# Patient Record
Sex: FEMALE | Race: WHITE | HISPANIC OR LATINO | Employment: FULL TIME | URBAN - METROPOLITAN AREA
[De-identification: names, ages, dates, MRNs, and addresses within clinical notes are randomized per-mention and may not be internally consistent; named-entity substitution may affect disease eponyms.]

---

## 2017-04-22 ENCOUNTER — HOSPITAL ENCOUNTER (INPATIENT)
Facility: HOSPITAL | Age: 37
LOS: 4 days | Discharge: HOME | DRG: 144 | End: 2017-04-26
Attending: EMERGENCY MEDICINE | Admitting: INTERNAL MEDICINE
Payer: COMMERCIAL

## 2017-04-22 ENCOUNTER — APPOINTMENT (EMERGENCY)
Dept: RADIOLOGY | Facility: HOSPITAL | Age: 37
DRG: 144 | End: 2017-04-22
Payer: COMMERCIAL

## 2017-04-22 ENCOUNTER — APPOINTMENT (INPATIENT)
Dept: CT IMAGING | Facility: HOSPITAL | Age: 37
DRG: 144 | End: 2017-04-22
Payer: COMMERCIAL

## 2017-04-22 DIAGNOSIS — J18.9 PNEUMONIA: ICD-10-CM

## 2017-04-22 DIAGNOSIS — R06.01 ORTHOPNEA: ICD-10-CM

## 2017-04-22 DIAGNOSIS — I21.4 NSTEMI (NON-ST ELEVATED MYOCARDIAL INFARCTION) (HCC): ICD-10-CM

## 2017-04-22 DIAGNOSIS — R06.00 DYSPNEA: Primary | ICD-10-CM

## 2017-04-22 PROBLEM — J45.909 ASTHMA: Chronic | Status: ACTIVE | Noted: 2017-04-22

## 2017-04-22 PROBLEM — I10 HTN (HYPERTENSION): Chronic | Status: ACTIVE | Noted: 2017-04-22

## 2017-04-22 PROBLEM — J45.909 ASTHMA: Status: ACTIVE | Noted: 2017-04-22

## 2017-04-22 PROBLEM — I50.9 ACUTE CHF (HCC): Status: ACTIVE | Noted: 2017-04-22

## 2017-04-22 PROBLEM — E87.6 HYPOKALEMIA: Status: ACTIVE | Noted: 2017-04-22

## 2017-04-22 PROBLEM — R73.9 HYPERGLYCEMIA: Status: ACTIVE | Noted: 2017-04-22

## 2017-04-22 LAB
ANION GAP SERPL CALCULATED.3IONS-SCNC: 10 MMOL/L (ref 4–13)
ANION GAP SERPL CALCULATED.3IONS-SCNC: 12 MMOL/L (ref 4–13)
APTT PPP: 28 SECONDS (ref 24–36)
APTT PPP: 42 SECONDS (ref 24–36)
APTT PPP: 62 SECONDS (ref 24–36)
BASOPHILS # BLD AUTO: 0.04 THOUSANDS/ΜL (ref 0–0.1)
BASOPHILS NFR BLD AUTO: 1 % (ref 0–1)
BUN SERPL-MCNC: 16 MG/DL (ref 5–25)
BUN SERPL-MCNC: 17 MG/DL (ref 5–25)
CALCIUM SERPL-MCNC: 8.5 MG/DL (ref 8.3–10.1)
CALCIUM SERPL-MCNC: 9.2 MG/DL (ref 8.3–10.1)
CHLORIDE SERPL-SCNC: 100 MMOL/L (ref 100–108)
CHLORIDE SERPL-SCNC: 105 MMOL/L (ref 100–108)
CHOLEST SERPL-MCNC: 171 MG/DL (ref 50–200)
CLARITY, POC: CLEAR
CO2 SERPL-SCNC: 25 MMOL/L (ref 21–32)
CO2 SERPL-SCNC: 26 MMOL/L (ref 21–32)
COLOR, POC: YELLOW
CREAT SERPL-MCNC: 1 MG/DL (ref 0.6–1.3)
CREAT SERPL-MCNC: 1.02 MG/DL (ref 0.6–1.3)
EOSINOPHIL # BLD AUTO: 0.2 THOUSAND/ΜL (ref 0–0.61)
EOSINOPHIL NFR BLD AUTO: 3 % (ref 0–6)
ERYTHROCYTE [DISTWIDTH] IN BLOOD BY AUTOMATED COUNT: 15.3 % (ref 11.6–15.1)
ERYTHROCYTE [DISTWIDTH] IN BLOOD BY AUTOMATED COUNT: 15.3 % (ref 11.6–15.1)
EST. AVERAGE GLUCOSE BLD GHB EST-MCNC: 114 MG/DL
EXT BILIRUBIN, UA: NEGATIVE
EXT BLOOD URINE: NEGATIVE
EXT GLUCOSE, UA: NEGATIVE
EXT KETONES: NEGATIVE
EXT NITRITE, UA: NEGATIVE
EXT PH, UA: 8.5
EXT PROTEIN, UA: NEGATIVE
EXT SPECIFIC GRAVITY, UA: 1.01
EXT UROBILINOGEN: NEGATIVE
GFR SERPL CREATININE-BSD FRML MDRD: >60 ML/MIN/1.73SQ M
GFR SERPL CREATININE-BSD FRML MDRD: >60 ML/MIN/1.73SQ M
GLUCOSE SERPL-MCNC: 160 MG/DL (ref 65–140)
GLUCOSE SERPL-MCNC: 177 MG/DL (ref 65–140)
HBA1C MFR BLD: 5.6 % (ref 4.2–6.3)
HCT VFR BLD AUTO: 38.5 % (ref 34.8–46.1)
HCT VFR BLD AUTO: 41.8 % (ref 34.8–46.1)
HDLC SERPL-MCNC: 81 MG/DL (ref 40–60)
HGB BLD-MCNC: 12.4 G/DL (ref 11.5–15.4)
HGB BLD-MCNC: 13.5 G/DL (ref 11.5–15.4)
INR PPP: 0.93 (ref 0.86–1.16)
LDLC SERPL CALC-MCNC: 83 MG/DL (ref 0–100)
LYMPHOCYTES # BLD AUTO: 2.22 THOUSANDS/ΜL (ref 0.6–4.47)
LYMPHOCYTES NFR BLD AUTO: 32 % (ref 14–44)
MAGNESIUM SERPL-MCNC: 1.5 MG/DL (ref 1.6–2.6)
MCH RBC QN AUTO: 25.8 PG (ref 26.8–34.3)
MCH RBC QN AUTO: 26.2 PG (ref 26.8–34.3)
MCHC RBC AUTO-ENTMCNC: 32.2 G/DL (ref 31.4–37.4)
MCHC RBC AUTO-ENTMCNC: 32.3 G/DL (ref 31.4–37.4)
MCV RBC AUTO: 80 FL (ref 82–98)
MCV RBC AUTO: 81 FL (ref 82–98)
MONOCYTES # BLD AUTO: 0.34 THOUSAND/ΜL (ref 0.17–1.22)
MONOCYTES NFR BLD AUTO: 5 % (ref 4–12)
NEUTROPHILS # BLD AUTO: 4.1 THOUSANDS/ΜL (ref 1.85–7.62)
NEUTS SEG NFR BLD AUTO: 59 % (ref 43–75)
NRBC BLD AUTO-RTO: 0 /100 WBCS
NT-PROBNP SERPL-MCNC: 437 PG/ML
PLATELET # BLD AUTO: 241 THOUSANDS/UL (ref 149–390)
PLATELET # BLD AUTO: 277 THOUSANDS/UL (ref 149–390)
PMV BLD AUTO: 11.5 FL (ref 8.9–12.7)
PMV BLD AUTO: 11.6 FL (ref 8.9–12.7)
POTASSIUM SERPL-SCNC: 3.3 MMOL/L (ref 3.5–5.3)
POTASSIUM SERPL-SCNC: 3.4 MMOL/L (ref 3.5–5.3)
PROTHROMBIN TIME: 12.4 SECONDS (ref 12–14.3)
RBC # BLD AUTO: 4.74 MILLION/UL (ref 3.81–5.12)
RBC # BLD AUTO: 5.23 MILLION/UL (ref 3.81–5.12)
SODIUM SERPL-SCNC: 138 MMOL/L (ref 136–145)
SODIUM SERPL-SCNC: 140 MMOL/L (ref 136–145)
TRIGL SERPL-MCNC: 33 MG/DL
TROPONIN I SERPL-MCNC: 0.13 NG/ML
TROPONIN I SERPL-MCNC: 0.15 NG/ML
TROPONIN I SERPL-MCNC: 0.22 NG/ML
WBC # BLD AUTO: 6.92 THOUSAND/UL (ref 4.31–10.16)
WBC # BLD AUTO: 7.47 THOUSAND/UL (ref 4.31–10.16)
WBC # BLD EST: NEGATIVE 10*3/UL

## 2017-04-22 PROCEDURE — 71275 CT ANGIOGRAPHY CHEST: CPT

## 2017-04-22 PROCEDURE — 84484 ASSAY OF TROPONIN QUANT: CPT | Performed by: EMERGENCY MEDICINE

## 2017-04-22 PROCEDURE — 85730 THROMBOPLASTIN TIME PARTIAL: CPT | Performed by: PHYSICIAN ASSISTANT

## 2017-04-22 PROCEDURE — 85610 PROTHROMBIN TIME: CPT | Performed by: PHYSICIAN ASSISTANT

## 2017-04-22 PROCEDURE — 81002 URINALYSIS NONAUTO W/O SCOPE: CPT | Performed by: EMERGENCY MEDICINE

## 2017-04-22 PROCEDURE — 93005 ELECTROCARDIOGRAM TRACING: CPT | Performed by: EMERGENCY MEDICINE

## 2017-04-22 PROCEDURE — 80048 BASIC METABOLIC PNL TOTAL CA: CPT | Performed by: EMERGENCY MEDICINE

## 2017-04-22 PROCEDURE — 85025 COMPLETE CBC W/AUTO DIFF WBC: CPT | Performed by: EMERGENCY MEDICINE

## 2017-04-22 PROCEDURE — 83036 HEMOGLOBIN GLYCOSYLATED A1C: CPT | Performed by: PHYSICIAN ASSISTANT

## 2017-04-22 PROCEDURE — 85027 COMPLETE CBC AUTOMATED: CPT | Performed by: PHYSICIAN ASSISTANT

## 2017-04-22 PROCEDURE — 81025 URINE PREGNANCY TEST: CPT | Performed by: EMERGENCY MEDICINE

## 2017-04-22 PROCEDURE — 71020 HB CHEST X-RAY 2VW FRONTAL&LATL: CPT

## 2017-04-22 PROCEDURE — 99285 EMERGENCY DEPT VISIT HI MDM: CPT

## 2017-04-22 PROCEDURE — 80061 LIPID PANEL: CPT | Performed by: PHYSICIAN ASSISTANT

## 2017-04-22 PROCEDURE — 83735 ASSAY OF MAGNESIUM: CPT | Performed by: PHYSICIAN ASSISTANT

## 2017-04-22 PROCEDURE — 85730 THROMBOPLASTIN TIME PARTIAL: CPT | Performed by: INTERNAL MEDICINE

## 2017-04-22 PROCEDURE — 36415 COLL VENOUS BLD VENIPUNCTURE: CPT | Performed by: EMERGENCY MEDICINE

## 2017-04-22 PROCEDURE — 83880 ASSAY OF NATRIURETIC PEPTIDE: CPT | Performed by: EMERGENCY MEDICINE

## 2017-04-22 PROCEDURE — 94640 AIRWAY INHALATION TREATMENT: CPT

## 2017-04-22 PROCEDURE — 84484 ASSAY OF TROPONIN QUANT: CPT | Performed by: PHYSICIAN ASSISTANT

## 2017-04-22 PROCEDURE — 80048 BASIC METABOLIC PNL TOTAL CA: CPT | Performed by: PHYSICIAN ASSISTANT

## 2017-04-22 RX ORDER — MONTELUKAST SODIUM 10 MG/1
10 TABLET ORAL
Status: DISCONTINUED | OUTPATIENT
Start: 2017-04-22 | End: 2017-04-26 | Stop reason: HOSPADM

## 2017-04-22 RX ORDER — BUDESONIDE AND FORMOTEROL FUMARATE DIHYDRATE 160; 4.5 UG/1; UG/1
2 AEROSOL RESPIRATORY (INHALATION) 2 TIMES DAILY
Status: DISCONTINUED | OUTPATIENT
Start: 2017-04-22 | End: 2017-04-26 | Stop reason: HOSPADM

## 2017-04-22 RX ORDER — CLONIDINE HYDROCHLORIDE 0.1 MG/1
0.2 TABLET ORAL 2 TIMES DAILY
Status: DISCONTINUED | OUTPATIENT
Start: 2017-04-22 | End: 2017-04-23

## 2017-04-22 RX ORDER — ACETAMINOPHEN 325 MG/1
650 TABLET ORAL EVERY 4 HOURS PRN
Status: DISCONTINUED | OUTPATIENT
Start: 2017-04-22 | End: 2017-04-26 | Stop reason: HOSPADM

## 2017-04-22 RX ORDER — ALBUTEROL SULFATE 2.5 MG/3ML
SOLUTION RESPIRATORY (INHALATION)
Status: DISPENSED
Start: 2017-04-22 | End: 2017-04-22

## 2017-04-22 RX ORDER — HEPARIN SODIUM 10000 [USP'U]/100ML
3-20 INJECTION, SOLUTION INTRAVENOUS
Status: DISCONTINUED | OUTPATIENT
Start: 2017-04-22 | End: 2017-04-25

## 2017-04-22 RX ORDER — ALBUTEROL SULFATE 2.5 MG/3ML
5 SOLUTION RESPIRATORY (INHALATION) ONCE
Status: COMPLETED | OUTPATIENT
Start: 2017-04-22 | End: 2017-04-22

## 2017-04-22 RX ORDER — DOCUSATE SODIUM 100 MG/1
100 CAPSULE, LIQUID FILLED ORAL 2 TIMES DAILY
Status: DISCONTINUED | OUTPATIENT
Start: 2017-04-22 | End: 2017-04-26 | Stop reason: HOSPADM

## 2017-04-22 RX ORDER — THIAMINE MONONITRATE (VIT B1) 100 MG
100 TABLET ORAL DAILY
Status: DISCONTINUED | OUTPATIENT
Start: 2017-04-22 | End: 2017-04-26 | Stop reason: HOSPADM

## 2017-04-22 RX ORDER — ONDANSETRON 2 MG/ML
4 INJECTION INTRAMUSCULAR; INTRAVENOUS EVERY 6 HOURS PRN
Status: DISCONTINUED | OUTPATIENT
Start: 2017-04-22 | End: 2017-04-26 | Stop reason: HOSPADM

## 2017-04-22 RX ORDER — HEPARIN SODIUM 1000 [USP'U]/ML
4000 INJECTION, SOLUTION INTRAVENOUS; SUBCUTANEOUS ONCE
Status: DISCONTINUED | OUTPATIENT
Start: 2017-04-22 | End: 2017-04-25

## 2017-04-22 RX ORDER — FUROSEMIDE 10 MG/ML
40 INJECTION INTRAMUSCULAR; INTRAVENOUS ONCE
Status: COMPLETED | OUTPATIENT
Start: 2017-04-22 | End: 2017-04-22

## 2017-04-22 RX ORDER — NITROGLYCERIN 0.4 MG/1
0.4 TABLET SUBLINGUAL ONCE
Status: COMPLETED | OUTPATIENT
Start: 2017-04-22 | End: 2017-04-22

## 2017-04-22 RX ORDER — HYDROCHLOROTHIAZIDE 25 MG/1
25 TABLET ORAL DAILY
Status: DISCONTINUED | OUTPATIENT
Start: 2017-04-22 | End: 2017-04-23

## 2017-04-22 RX ORDER — BUDESONIDE AND FORMOTEROL FUMARATE DIHYDRATE 160; 4.5 UG/1; UG/1
2 AEROSOL RESPIRATORY (INHALATION) 2 TIMES DAILY
COMMUNITY

## 2017-04-22 RX ORDER — MONTELUKAST SODIUM 10 MG/1
10 TABLET ORAL
Status: ON HOLD | COMMUNITY
End: 2018-12-14

## 2017-04-22 RX ORDER — ALBUTEROL SULFATE 2.5 MG/3ML
2.5 SOLUTION RESPIRATORY (INHALATION) EVERY 4 HOURS PRN
Status: DISCONTINUED | OUTPATIENT
Start: 2017-04-22 | End: 2017-04-26 | Stop reason: HOSPADM

## 2017-04-22 RX ORDER — ALBUTEROL SULFATE 90 UG/1
2 AEROSOL, METERED RESPIRATORY (INHALATION) EVERY 6 HOURS PRN
COMMUNITY

## 2017-04-22 RX ORDER — CLONIDINE HYDROCHLORIDE 0.2 MG/1
0.2 TABLET ORAL 2 TIMES DAILY
COMMUNITY
End: 2017-04-26 | Stop reason: HOSPADM

## 2017-04-22 RX ORDER — FUROSEMIDE 40 MG/1
40 TABLET ORAL
Status: DISCONTINUED | OUTPATIENT
Start: 2017-04-22 | End: 2017-04-22

## 2017-04-22 RX ORDER — HEPARIN SODIUM 1000 [USP'U]/ML
2000 INJECTION, SOLUTION INTRAVENOUS; SUBCUTANEOUS AS NEEDED
Status: DISCONTINUED | OUTPATIENT
Start: 2017-04-22 | End: 2017-04-25

## 2017-04-22 RX ORDER — ASPIRIN 81 MG/1
325 TABLET, CHEWABLE ORAL DAILY
Status: DISCONTINUED | OUTPATIENT
Start: 2017-04-23 | End: 2017-04-25

## 2017-04-22 RX ORDER — VERAPAMIL HYDROCHLORIDE 360 MG/1
360 CAPSULE, DELAYED RELEASE PELLETS ORAL DAILY
COMMUNITY
End: 2018-12-14 | Stop reason: HOSPADM

## 2017-04-22 RX ORDER — CALCIUM CARBONATE 200(500)MG
1000 TABLET,CHEWABLE ORAL DAILY PRN
Status: DISCONTINUED | OUTPATIENT
Start: 2017-04-22 | End: 2017-04-26 | Stop reason: HOSPADM

## 2017-04-22 RX ORDER — HEPARIN SODIUM 1000 [USP'U]/ML
4000 INJECTION, SOLUTION INTRAVENOUS; SUBCUTANEOUS AS NEEDED
Status: DISCONTINUED | OUTPATIENT
Start: 2017-04-22 | End: 2017-04-25

## 2017-04-22 RX ORDER — POTASSIUM CHLORIDE 750 MG/1
10 TABLET, EXTENDED RELEASE ORAL 2 TIMES DAILY
Status: DISCONTINUED | OUTPATIENT
Start: 2017-04-22 | End: 2017-04-24

## 2017-04-22 RX ORDER — HYDROCHLOROTHIAZIDE 25 MG/1
25 TABLET ORAL EVERY OTHER DAY
Status: ON HOLD | COMMUNITY
End: 2018-12-14

## 2017-04-22 RX ORDER — LOSARTAN POTASSIUM 50 MG/1
100 TABLET ORAL DAILY
Status: DISCONTINUED | OUTPATIENT
Start: 2017-04-22 | End: 2017-04-26 | Stop reason: HOSPADM

## 2017-04-22 RX ORDER — LANOLIN ALCOHOL/MO/W.PET/CERES
100 CREAM (GRAM) TOPICAL DAILY
COMMUNITY

## 2017-04-22 RX ORDER — POTASSIUM CHLORIDE 750 MG/1
10 TABLET, EXTENDED RELEASE ORAL DAILY
COMMUNITY

## 2017-04-22 RX ORDER — POTASSIUM CHLORIDE 20 MEQ/1
20 TABLET, EXTENDED RELEASE ORAL ONCE
Status: COMPLETED | OUTPATIENT
Start: 2017-04-22 | End: 2017-04-22

## 2017-04-22 RX ORDER — LOSARTAN POTASSIUM 100 MG/1
100 TABLET ORAL DAILY
COMMUNITY
End: 2018-12-14 | Stop reason: HOSPADM

## 2017-04-22 RX ADMIN — HEPARIN SODIUM 4000 UNITS: 1000 INJECTION, SOLUTION INTRAVENOUS; SUBCUTANEOUS at 11:39

## 2017-04-22 RX ADMIN — HEPARIN SODIUM AND DEXTROSE 15.1 UNITS/KG/HR: 10000; 5 INJECTION INTRAVENOUS at 21:37

## 2017-04-22 RX ADMIN — AZITHROMYCIN MONOHYDRATE 500 MG: 500 INJECTION, POWDER, LYOPHILIZED, FOR SOLUTION INTRAVENOUS at 10:00

## 2017-04-22 RX ADMIN — HEPARIN SODIUM 4000 UNITS: 1000 INJECTION, SOLUTION INTRAVENOUS; SUBCUTANEOUS at 05:31

## 2017-04-22 RX ADMIN — POTASSIUM CHLORIDE 10 MEQ: 750 TABLET, EXTENDED RELEASE ORAL at 17:26

## 2017-04-22 RX ADMIN — DOCUSATE SODIUM 100 MG: 100 CAPSULE, LIQUID FILLED ORAL at 17:26

## 2017-04-22 RX ADMIN — FUROSEMIDE 40 MG: 10 INJECTION, SOLUTION INTRAMUSCULAR; INTRAVENOUS at 04:11

## 2017-04-22 RX ADMIN — Medication 100 MG: at 08:20

## 2017-04-22 RX ADMIN — FUROSEMIDE 40 MG: 40 TABLET ORAL at 08:19

## 2017-04-22 RX ADMIN — ALBUTEROL SULFATE 5 MG: 2.5 SOLUTION RESPIRATORY (INHALATION) at 01:59

## 2017-04-22 RX ADMIN — LOSARTAN POTASSIUM 100 MG: 50 TABLET, FILM COATED ORAL at 08:19

## 2017-04-22 RX ADMIN — POTASSIUM CHLORIDE 20 MEQ: 1500 TABLET, EXTENDED RELEASE ORAL at 06:47

## 2017-04-22 RX ADMIN — HEPARIN SODIUM AND DEXTROSE 11.1 UNITS/KG/HR: 10000; 5 INJECTION INTRAVENOUS at 05:31

## 2017-04-22 RX ADMIN — DOCUSATE SODIUM 100 MG: 100 CAPSULE, LIQUID FILLED ORAL at 08:20

## 2017-04-22 RX ADMIN — BUDESONIDE AND FORMOTEROL FUMARATE DIHYDRATE 2 PUFF: 160; 4.5 AEROSOL RESPIRATORY (INHALATION) at 08:20

## 2017-04-22 RX ADMIN — POTASSIUM CHLORIDE 10 MEQ: 750 TABLET, EXTENDED RELEASE ORAL at 08:20

## 2017-04-22 RX ADMIN — CLONIDINE HYDROCHLORIDE 0.2 MG: 0.1 TABLET ORAL at 17:26

## 2017-04-22 RX ADMIN — NITROGLYCERIN 0.4 MG: 0.4 TABLET SUBLINGUAL at 04:11

## 2017-04-22 RX ADMIN — IPRATROPIUM BROMIDE 0.5 MG: 0.5 SOLUTION RESPIRATORY (INHALATION) at 01:59

## 2017-04-22 RX ADMIN — BUDESONIDE AND FORMOTEROL FUMARATE DIHYDRATE 2 PUFF: 160; 4.5 AEROSOL RESPIRATORY (INHALATION) at 17:26

## 2017-04-22 RX ADMIN — MONTELUKAST SODIUM 10 MG: 10 TABLET, FILM COATED ORAL at 21:10

## 2017-04-22 RX ADMIN — IOHEXOL 90 ML: 350 INJECTION, SOLUTION INTRAVENOUS at 04:38

## 2017-04-22 RX ADMIN — CEFTRIAXONE 1000 MG: 1 INJECTION, POWDER, FOR SOLUTION INTRAMUSCULAR; INTRAVENOUS at 09:00

## 2017-04-22 RX ADMIN — VERAPAMIL HYDROCHLORIDE 360 MG: 180 TABLET, FILM COATED, EXTENDED RELEASE ORAL at 21:10

## 2017-04-22 RX ADMIN — HYDROCHLOROTHIAZIDE 25 MG: 25 TABLET ORAL at 08:20

## 2017-04-22 RX ADMIN — CLONIDINE HYDROCHLORIDE 0.2 MG: 0.1 TABLET ORAL at 08:19

## 2017-04-23 LAB
APTT PPP: 55 SECONDS (ref 24–36)
APTT PPP: 63 SECONDS (ref 24–36)
APTT PPP: 64 SECONDS (ref 24–36)

## 2017-04-23 PROCEDURE — 85730 THROMBOPLASTIN TIME PARTIAL: CPT | Performed by: INTERNAL MEDICINE

## 2017-04-23 RX ORDER — CLONIDINE HYDROCHLORIDE 0.1 MG/1
0.1 TABLET ORAL 2 TIMES DAILY
Status: DISCONTINUED | OUTPATIENT
Start: 2017-04-23 | End: 2017-04-26 | Stop reason: HOSPADM

## 2017-04-23 RX ADMIN — CEFTRIAXONE 1000 MG: 1 INJECTION, POWDER, FOR SOLUTION INTRAMUSCULAR; INTRAVENOUS at 09:16

## 2017-04-23 RX ADMIN — DOCUSATE SODIUM 100 MG: 100 CAPSULE, LIQUID FILLED ORAL at 08:42

## 2017-04-23 RX ADMIN — DOCUSATE SODIUM 100 MG: 100 CAPSULE, LIQUID FILLED ORAL at 17:51

## 2017-04-23 RX ADMIN — HYDROCHLOROTHIAZIDE 25 MG: 25 TABLET ORAL at 08:42

## 2017-04-23 RX ADMIN — HEPARIN SODIUM 2000 UNITS: 1000 INJECTION, SOLUTION INTRAVENOUS; SUBCUTANEOUS at 00:29

## 2017-04-23 RX ADMIN — Medication 100 MG: at 08:42

## 2017-04-23 RX ADMIN — LOSARTAN POTASSIUM 100 MG: 50 TABLET, FILM COATED ORAL at 08:42

## 2017-04-23 RX ADMIN — BUDESONIDE AND FORMOTEROL FUMARATE DIHYDRATE 2 PUFF: 160; 4.5 AEROSOL RESPIRATORY (INHALATION) at 08:43

## 2017-04-23 RX ADMIN — BUDESONIDE AND FORMOTEROL FUMARATE DIHYDRATE 2 PUFF: 160; 4.5 AEROSOL RESPIRATORY (INHALATION) at 17:52

## 2017-04-23 RX ADMIN — MONTELUKAST SODIUM 10 MG: 10 TABLET, FILM COATED ORAL at 21:30

## 2017-04-23 RX ADMIN — ASPIRIN 81 MG CHEWABLE TABLET 325 MG: 81 TABLET CHEWABLE at 08:48

## 2017-04-23 RX ADMIN — HEPARIN SODIUM AND DEXTROSE 17.1 UNITS/KG/HR: 10000; 5 INJECTION INTRAVENOUS at 13:47

## 2017-04-23 RX ADMIN — POTASSIUM CHLORIDE 10 MEQ: 750 TABLET, EXTENDED RELEASE ORAL at 17:51

## 2017-04-23 RX ADMIN — POTASSIUM CHLORIDE 10 MEQ: 750 TABLET, EXTENDED RELEASE ORAL at 08:43

## 2017-04-23 RX ADMIN — AZITHROMYCIN MONOHYDRATE 500 MG: 500 INJECTION, POWDER, LYOPHILIZED, FOR SOLUTION INTRAVENOUS at 09:56

## 2017-04-23 RX ADMIN — CLONIDINE HYDROCHLORIDE 0.1 MG: 0.1 TABLET ORAL at 17:51

## 2017-04-23 RX ADMIN — CLONIDINE HYDROCHLORIDE 0.2 MG: 0.1 TABLET ORAL at 08:42

## 2017-04-24 ENCOUNTER — APPOINTMENT (INPATIENT)
Dept: NON INVASIVE DIAGNOSTICS | Facility: HOSPITAL | Age: 37
DRG: 144 | End: 2017-04-24
Payer: COMMERCIAL

## 2017-04-24 ENCOUNTER — APPOINTMENT (INPATIENT)
Dept: NUCLEAR MEDICINE | Facility: HOSPITAL | Age: 37
DRG: 144 | End: 2017-04-24
Payer: COMMERCIAL

## 2017-04-24 LAB
ANION GAP SERPL CALCULATED.3IONS-SCNC: 9 MMOL/L (ref 4–13)
APTT PPP: 45 SECONDS (ref 24–36)
APTT PPP: 55 SECONDS (ref 24–36)
APTT PPP: 71 SECONDS (ref 24–36)
ATRIAL RATE: 97 BPM
BUN SERPL-MCNC: 22 MG/DL (ref 5–25)
CALCIUM SERPL-MCNC: 8.7 MG/DL (ref 8.3–10.1)
CHLORIDE SERPL-SCNC: 102 MMOL/L (ref 100–108)
CO2 SERPL-SCNC: 27 MMOL/L (ref 21–32)
CREAT SERPL-MCNC: 1.1 MG/DL (ref 0.6–1.3)
CRP SERPL QL: 7.7 MG/L
ERYTHROCYTE [DISTWIDTH] IN BLOOD BY AUTOMATED COUNT: 15.8 % (ref 11.6–15.1)
ERYTHROCYTE [SEDIMENTATION RATE] IN BLOOD: 46 MM/HOUR (ref 0–20)
GFR SERPL CREATININE-BSD FRML MDRD: 55.9 ML/MIN/1.73SQ M
GLUCOSE SERPL-MCNC: 109 MG/DL (ref 65–140)
HCT VFR BLD AUTO: 37.9 % (ref 34.8–46.1)
HGB BLD-MCNC: 12.1 G/DL (ref 11.5–15.4)
MAGNESIUM SERPL-MCNC: 1.9 MG/DL (ref 1.6–2.6)
MAX DIASTOLIC BP: 82 MMHG
MAX HEART RATE: 108 BPM
MAX PREDICTED HEART RATE: 183 BPM
MAX. SYSTOLIC BP: 128 MMHG
MCH RBC QN AUTO: 26 PG (ref 26.8–34.3)
MCHC RBC AUTO-ENTMCNC: 31.9 G/DL (ref 31.4–37.4)
MCV RBC AUTO: 82 FL (ref 82–98)
P AXIS: 65 DEGREES
PLATELET # BLD AUTO: 249 THOUSANDS/UL (ref 149–390)
PMV BLD AUTO: 11 FL (ref 8.9–12.7)
POTASSIUM SERPL-SCNC: 4 MMOL/L (ref 3.5–5.3)
PR INTERVAL: 126 MS
PROTOCOL NAME: NORMAL
QRS AXIS: 5 DEGREES
QRSD INTERVAL: 76 MS
QT INTERVAL: 370 MS
QTC INTERVAL: 469 MS
RBC # BLD AUTO: 4.65 MILLION/UL (ref 3.81–5.12)
REASON FOR TERMINATION: NORMAL
SODIUM SERPL-SCNC: 138 MMOL/L (ref 136–145)
T WAVE AXIS: 119 DEGREES
TARGET HR FORMULA: NORMAL
TEST INDICATION: NORMAL
TIME IN EXERCISE PHASE: 180 S
VENTRICULAR RATE: 97 BPM
WBC # BLD AUTO: 7.21 THOUSAND/UL (ref 4.31–10.16)

## 2017-04-24 PROCEDURE — 80048 BASIC METABOLIC PNL TOTAL CA: CPT | Performed by: FAMILY MEDICINE

## 2017-04-24 PROCEDURE — 83735 ASSAY OF MAGNESIUM: CPT | Performed by: FAMILY MEDICINE

## 2017-04-24 PROCEDURE — 86140 C-REACTIVE PROTEIN: CPT | Performed by: PHYSICIAN ASSISTANT

## 2017-04-24 PROCEDURE — 86039 ANTINUCLEAR ANTIBODIES (ANA): CPT | Performed by: PHYSICIAN ASSISTANT

## 2017-04-24 PROCEDURE — 86038 ANTINUCLEAR ANTIBODIES: CPT | Performed by: PHYSICIAN ASSISTANT

## 2017-04-24 PROCEDURE — 85730 THROMBOPLASTIN TIME PARTIAL: CPT | Performed by: INTERNAL MEDICINE

## 2017-04-24 PROCEDURE — A9502 TC99M TETROFOSMIN: HCPCS

## 2017-04-24 PROCEDURE — 85652 RBC SED RATE AUTOMATED: CPT | Performed by: PHYSICIAN ASSISTANT

## 2017-04-24 PROCEDURE — 93017 CV STRESS TEST TRACING ONLY: CPT

## 2017-04-24 PROCEDURE — 86430 RHEUMATOID FACTOR TEST QUAL: CPT | Performed by: PHYSICIAN ASSISTANT

## 2017-04-24 PROCEDURE — 78452 HT MUSCLE IMAGE SPECT MULT: CPT

## 2017-04-24 PROCEDURE — 85027 COMPLETE CBC AUTOMATED: CPT | Performed by: FAMILY MEDICINE

## 2017-04-24 PROCEDURE — 86255 FLUORESCENT ANTIBODY SCREEN: CPT | Performed by: PHYSICIAN ASSISTANT

## 2017-04-24 RX ORDER — HEPARIN SODIUM 1000 [USP'U]/ML
30 INJECTION, SOLUTION INTRAVENOUS; SUBCUTANEOUS ONCE
Status: DISCONTINUED | OUTPATIENT
Start: 2017-04-24 | End: 2017-04-24

## 2017-04-24 RX ORDER — ATORVASTATIN CALCIUM 40 MG/1
40 TABLET, FILM COATED ORAL
Status: DISCONTINUED | OUTPATIENT
Start: 2017-04-24 | End: 2017-04-25

## 2017-04-24 RX ADMIN — AZITHROMYCIN MONOHYDRATE 500 MG: 500 INJECTION, POWDER, LYOPHILIZED, FOR SOLUTION INTRAVENOUS at 15:01

## 2017-04-24 RX ADMIN — CEFTRIAXONE 1000 MG: 1 INJECTION, POWDER, FOR SOLUTION INTRAMUSCULAR; INTRAVENOUS at 14:14

## 2017-04-24 RX ADMIN — BUDESONIDE AND FORMOTEROL FUMARATE DIHYDRATE 2 PUFF: 160; 4.5 AEROSOL RESPIRATORY (INHALATION) at 08:26

## 2017-04-24 RX ADMIN — CLONIDINE HYDROCHLORIDE 0.1 MG: 0.1 TABLET ORAL at 08:26

## 2017-04-24 RX ADMIN — POTASSIUM CHLORIDE 10 MEQ: 750 TABLET, EXTENDED RELEASE ORAL at 08:26

## 2017-04-24 RX ADMIN — HEPARIN SODIUM 2000 UNITS: 1000 INJECTION, SOLUTION INTRAVENOUS; SUBCUTANEOUS at 08:26

## 2017-04-24 RX ADMIN — Medication 100 MG: at 08:26

## 2017-04-24 RX ADMIN — CLONIDINE HYDROCHLORIDE 0.1 MG: 0.1 TABLET ORAL at 20:57

## 2017-04-24 RX ADMIN — VERAPAMIL HYDROCHLORIDE 240 MG: 120 TABLET, FILM COATED, EXTENDED RELEASE ORAL at 21:01

## 2017-04-24 RX ADMIN — DOCUSATE SODIUM 100 MG: 100 CAPSULE, LIQUID FILLED ORAL at 08:25

## 2017-04-24 RX ADMIN — REGADENOSON 0.4 MG: 0.08 INJECTION, SOLUTION INTRAVENOUS at 11:07

## 2017-04-24 RX ADMIN — HEPARIN SODIUM 2000 UNITS: 1000 INJECTION, SOLUTION INTRAVENOUS; SUBCUTANEOUS at 21:20

## 2017-04-24 RX ADMIN — ATORVASTATIN CALCIUM 40 MG: 40 TABLET, FILM COATED ORAL at 17:02

## 2017-04-24 RX ADMIN — ASPIRIN 81 MG CHEWABLE TABLET 324 MG: 81 TABLET CHEWABLE at 09:23

## 2017-04-24 RX ADMIN — BUDESONIDE AND FORMOTEROL FUMARATE DIHYDRATE 2 PUFF: 160; 4.5 AEROSOL RESPIRATORY (INHALATION) at 17:02

## 2017-04-24 RX ADMIN — HEPARIN SODIUM AND DEXTROSE 17.1 UNITS/KG/HR: 10000; 5 INJECTION INTRAVENOUS at 06:28

## 2017-04-24 RX ADMIN — LOSARTAN POTASSIUM 100 MG: 50 TABLET, FILM COATED ORAL at 08:25

## 2017-04-24 RX ADMIN — HEPARIN SODIUM AND DEXTROSE 19.1 UNITS/KG/HR: 10000; 5 INJECTION INTRAVENOUS at 20:53

## 2017-04-24 RX ADMIN — MONTELUKAST SODIUM 10 MG: 10 TABLET, FILM COATED ORAL at 21:02

## 2017-04-24 RX ADMIN — DOCUSATE SODIUM 100 MG: 100 CAPSULE, LIQUID FILLED ORAL at 17:02

## 2017-04-25 ENCOUNTER — APPOINTMENT (INPATIENT)
Dept: INTERVENTIONAL RADIOLOGY/VASCULAR | Facility: HOSPITAL | Age: 37
DRG: 144 | End: 2017-04-25
Payer: COMMERCIAL

## 2017-04-25 ENCOUNTER — APPOINTMENT (INPATIENT)
Dept: NON INVASIVE DIAGNOSTICS | Facility: HOSPITAL | Age: 37
DRG: 144 | End: 2017-04-25
Payer: COMMERCIAL

## 2017-04-25 LAB
APTT PPP: 100 SECONDS (ref 24–36)
RHEUMATOID FACT SER QL LA: NEGATIVE

## 2017-04-25 PROCEDURE — 99152 MOD SED SAME PHYS/QHP 5/>YRS: CPT | Performed by: INTERNAL MEDICINE

## 2017-04-25 PROCEDURE — C1894 INTRO/SHEATH, NON-LASER: HCPCS | Performed by: INTERNAL MEDICINE

## 2017-04-25 PROCEDURE — 4A023N7 MEASUREMENT OF CARDIAC SAMPLING AND PRESSURE, LEFT HEART, PERCUTANEOUS APPROACH: ICD-10-PCS | Performed by: INTERNAL MEDICINE

## 2017-04-25 PROCEDURE — 85730 THROMBOPLASTIN TIME PARTIAL: CPT | Performed by: INTERNAL MEDICINE

## 2017-04-25 PROCEDURE — 93458 L HRT ARTERY/VENTRICLE ANGIO: CPT | Performed by: INTERNAL MEDICINE

## 2017-04-25 PROCEDURE — 99153 MOD SED SAME PHYS/QHP EA: CPT | Performed by: INTERNAL MEDICINE

## 2017-04-25 PROCEDURE — C1769 GUIDE WIRE: HCPCS | Performed by: INTERNAL MEDICINE

## 2017-04-25 PROCEDURE — B2111ZZ FLUOROSCOPY OF MULTIPLE CORONARY ARTERIES USING LOW OSMOLAR CONTRAST: ICD-10-PCS | Performed by: INTERNAL MEDICINE

## 2017-04-25 PROCEDURE — 93306 TTE W/DOPPLER COMPLETE: CPT

## 2017-04-25 RX ORDER — NITROGLYCERIN 20 MG/100ML
INJECTION INTRAVENOUS CODE/TRAUMA/SEDATION MEDICATION
Status: COMPLETED | OUTPATIENT
Start: 2017-04-25 | End: 2017-04-25

## 2017-04-25 RX ORDER — HEPARIN SODIUM 1000 [USP'U]/ML
INJECTION, SOLUTION INTRAVENOUS; SUBCUTANEOUS CODE/TRAUMA/SEDATION MEDICATION
Status: COMPLETED | OUTPATIENT
Start: 2017-04-25 | End: 2017-04-25

## 2017-04-25 RX ORDER — ASPIRIN 81 MG/1
81 TABLET, CHEWABLE ORAL DAILY
Status: DISCONTINUED | OUTPATIENT
Start: 2017-04-26 | End: 2017-04-25

## 2017-04-25 RX ORDER — SODIUM CHLORIDE 9 MG/ML
75 INJECTION, SOLUTION INTRAVENOUS CONTINUOUS
Status: DISCONTINUED | OUTPATIENT
Start: 2017-04-25 | End: 2017-04-26

## 2017-04-25 RX ORDER — MIDAZOLAM HYDROCHLORIDE 1 MG/ML
INJECTION INTRAMUSCULAR; INTRAVENOUS CODE/TRAUMA/SEDATION MEDICATION
Status: COMPLETED | OUTPATIENT
Start: 2017-04-25 | End: 2017-04-25

## 2017-04-25 RX ORDER — VERAPAMIL HCL 2.5 MG/ML
AMPUL (ML) INTRAVENOUS CODE/TRAUMA/SEDATION MEDICATION
Status: COMPLETED | OUTPATIENT
Start: 2017-04-25 | End: 2017-04-25

## 2017-04-25 RX ADMIN — DOCUSATE SODIUM 100 MG: 100 CAPSULE, LIQUID FILLED ORAL at 11:21

## 2017-04-25 RX ADMIN — MONTELUKAST SODIUM 10 MG: 10 TABLET, FILM COATED ORAL at 21:25

## 2017-04-25 RX ADMIN — CLONIDINE HYDROCHLORIDE 0.1 MG: 0.1 TABLET ORAL at 19:57

## 2017-04-25 RX ADMIN — SODIUM CHLORIDE 75 ML/HR: 0.9 INJECTION, SOLUTION INTRAVENOUS at 11:21

## 2017-04-25 RX ADMIN — Medication 100 MG: at 11:20

## 2017-04-25 RX ADMIN — ASPIRIN 81 MG CHEWABLE TABLET 324 MG: 81 TABLET CHEWABLE at 08:23

## 2017-04-25 RX ADMIN — DOCUSATE SODIUM 100 MG: 100 CAPSULE, LIQUID FILLED ORAL at 17:23

## 2017-04-25 RX ADMIN — VERAPAMIL HYDROCHLORIDE 240 MG: 120 TABLET, FILM COATED, EXTENDED RELEASE ORAL at 21:26

## 2017-04-25 RX ADMIN — MIDAZOLAM HYDROCHLORIDE 1 MG: 1 INJECTION, SOLUTION INTRAMUSCULAR; INTRAVENOUS at 08:53

## 2017-04-25 RX ADMIN — BUDESONIDE AND FORMOTEROL FUMARATE DIHYDRATE 2 PUFF: 160; 4.5 AEROSOL RESPIRATORY (INHALATION) at 17:23

## 2017-04-25 RX ADMIN — IOHEXOL 60 ML: 350 INJECTION, SOLUTION INTRAVENOUS at 09:11

## 2017-04-25 RX ADMIN — AZITHROMYCIN MONOHYDRATE 500 MG: 500 INJECTION, POWDER, LYOPHILIZED, FOR SOLUTION INTRAVENOUS at 13:37

## 2017-04-25 RX ADMIN — NITROGLYCERIN 200 MCG: 20 INJECTION INTRAVENOUS at 09:01

## 2017-04-25 RX ADMIN — HEPARIN SODIUM 2000 UNITS: 1000 INJECTION INTRAVENOUS; SUBCUTANEOUS at 09:11

## 2017-04-25 RX ADMIN — VERAPAMIL HYDROCHLORIDE 2.5 MG: 2.5 INJECTION, SOLUTION INTRAVENOUS at 09:02

## 2017-04-25 RX ADMIN — CEFTRIAXONE 1000 MG: 1 INJECTION, POWDER, FOR SOLUTION INTRAMUSCULAR; INTRAVENOUS at 12:11

## 2017-04-25 RX ADMIN — BUDESONIDE AND FORMOTEROL FUMARATE DIHYDRATE 2 PUFF: 160; 4.5 AEROSOL RESPIRATORY (INHALATION) at 08:23

## 2017-04-26 VITALS
TEMPERATURE: 98.4 F | BODY MASS INDEX: 42.27 KG/M2 | HEIGHT: 63 IN | SYSTOLIC BLOOD PRESSURE: 120 MMHG | WEIGHT: 238.54 LBS | DIASTOLIC BLOOD PRESSURE: 86 MMHG | OXYGEN SATURATION: 98 % | HEART RATE: 79 BPM | RESPIRATION RATE: 18 BRPM

## 2017-04-26 PROBLEM — I50.9 ACUTE CHF (HCC): Status: RESOLVED | Noted: 2017-04-22 | Resolved: 2017-04-26

## 2017-04-26 PROBLEM — J18.9 PNEUMONIA: Status: ACTIVE | Noted: 2017-04-26

## 2017-04-26 PROBLEM — I21.4 NSTEMI (NON-ST ELEVATED MYOCARDIAL INFARCTION) (HCC): Status: RESOLVED | Noted: 2017-04-22 | Resolved: 2017-04-26

## 2017-04-26 PROBLEM — E87.6 HYPOKALEMIA: Status: RESOLVED | Noted: 2017-04-22 | Resolved: 2017-04-26

## 2017-04-26 PROBLEM — R73.9 HYPERGLYCEMIA: Status: RESOLVED | Noted: 2017-04-22 | Resolved: 2017-04-26

## 2017-04-26 LAB
ANA HOMOGEN SER QL IF: NORMAL
ANA HOMOGEN TITR SER: NORMAL {TITER}
ANION GAP SERPL CALCULATED.3IONS-SCNC: 9 MMOL/L (ref 4–13)
BUN SERPL-MCNC: 13 MG/DL (ref 5–25)
CALCIUM SERPL-MCNC: 8.5 MG/DL (ref 8.3–10.1)
CHLORIDE SERPL-SCNC: 104 MMOL/L (ref 100–108)
CO2 SERPL-SCNC: 25 MMOL/L (ref 21–32)
CREAT SERPL-MCNC: 0.94 MG/DL (ref 0.6–1.3)
GFR SERPL CREATININE-BSD FRML MDRD: >60 ML/MIN/1.73SQ M
GLUCOSE SERPL-MCNC: 95 MG/DL (ref 65–140)
MAGNESIUM SERPL-MCNC: 1.9 MG/DL (ref 1.6–2.6)
POTASSIUM SERPL-SCNC: 4.3 MMOL/L (ref 3.5–5.3)
RYE IGE QN: POSITIVE
SODIUM SERPL-SCNC: 138 MMOL/L (ref 136–145)

## 2017-04-26 PROCEDURE — 83735 ASSAY OF MAGNESIUM: CPT | Performed by: INTERNAL MEDICINE

## 2017-04-26 PROCEDURE — 80048 BASIC METABOLIC PNL TOTAL CA: CPT | Performed by: INTERNAL MEDICINE

## 2017-04-26 RX ORDER — CLONIDINE HYDROCHLORIDE 0.1 MG/1
0.1 TABLET ORAL 2 TIMES DAILY
Qty: 60 TABLET | Refills: 0 | Status: ON HOLD | OUTPATIENT
Start: 2017-04-26 | End: 2018-12-14

## 2017-04-26 RX ORDER — CEFDINIR 300 MG/1
300 CAPSULE ORAL EVERY 12 HOURS SCHEDULED
Status: DISCONTINUED | OUTPATIENT
Start: 2017-04-26 | End: 2017-04-26 | Stop reason: HOSPADM

## 2017-04-26 RX ORDER — CEFDINIR 300 MG/1
300 CAPSULE ORAL EVERY 12 HOURS SCHEDULED
Qty: 6 CAPSULE | Refills: 0 | Status: SHIPPED | OUTPATIENT
Start: 2017-04-26 | End: 2017-04-29

## 2017-04-26 RX ORDER — AZITHROMYCIN 250 MG/1
500 TABLET, FILM COATED ORAL ONCE
Status: COMPLETED | OUTPATIENT
Start: 2017-04-26 | End: 2017-04-26

## 2017-04-26 RX ADMIN — AZITHROMYCIN 500 MG: 250 TABLET, FILM COATED ORAL at 12:20

## 2017-04-26 RX ADMIN — LOSARTAN POTASSIUM 100 MG: 50 TABLET, FILM COATED ORAL at 09:38

## 2017-04-26 RX ADMIN — CLONIDINE HYDROCHLORIDE 0.1 MG: 0.1 TABLET ORAL at 09:38

## 2017-04-26 RX ADMIN — ENOXAPARIN SODIUM 40 MG: 40 INJECTION SUBCUTANEOUS at 09:38

## 2017-04-26 RX ADMIN — CEFDINIR 300 MG: 300 CAPSULE ORAL at 12:21

## 2017-04-26 RX ADMIN — BUDESONIDE AND FORMOTEROL FUMARATE DIHYDRATE 2 PUFF: 160; 4.5 AEROSOL RESPIRATORY (INHALATION) at 09:39

## 2017-04-26 RX ADMIN — DOCUSATE SODIUM 100 MG: 100 CAPSULE, LIQUID FILLED ORAL at 09:38

## 2017-04-26 RX ADMIN — Medication 100 MG: at 09:38

## 2017-04-26 RX ADMIN — SODIUM CHLORIDE 75 ML/HR: 0.9 INJECTION, SOLUTION INTRAVENOUS at 00:00

## 2017-04-27 LAB
C-ANCA TITR SER IF: NORMAL TITER
MYELOPEROXIDASE AB SER IA-ACNC: <9 U/ML (ref 0–9)
P-ANCA ATYPICAL TITR SER IF: NORMAL TITER
P-ANCA TITR SER IF: NORMAL TITER
PROTEINASE3 AB SER IA-ACNC: <3.5 U/ML (ref 0–3.5)

## 2017-05-26 ENCOUNTER — ALLSCRIPTS OFFICE VISIT (OUTPATIENT)
Dept: OTHER | Facility: OTHER | Age: 37
End: 2017-05-26

## 2017-06-30 ENCOUNTER — ALLSCRIPTS OFFICE VISIT (OUTPATIENT)
Dept: OTHER | Facility: OTHER | Age: 37
End: 2017-06-30

## 2017-10-25 ENCOUNTER — HOSPITAL ENCOUNTER (EMERGENCY)
Facility: HOSPITAL | Age: 37
Discharge: HOME | End: 2017-10-26
Attending: EMERGENCY MEDICINE | Admitting: EMERGENCY MEDICINE
Payer: COMMERCIAL

## 2017-10-25 VITALS
DIASTOLIC BLOOD PRESSURE: 81 MMHG | TEMPERATURE: 98.7 F | BODY MASS INDEX: 42.52 KG/M2 | RESPIRATION RATE: 18 BRPM | HEART RATE: 108 BPM | HEIGHT: 63 IN | OXYGEN SATURATION: 98 % | WEIGHT: 240 LBS | SYSTOLIC BLOOD PRESSURE: 119 MMHG

## 2017-10-25 DIAGNOSIS — R50.9 FEVER: Primary | ICD-10-CM

## 2017-10-25 DIAGNOSIS — E87.6 HYPOKALEMIA: ICD-10-CM

## 2017-10-25 LAB
BASOPHILS # BLD AUTO: 0.01 THOUSANDS/ΜL (ref 0–0.1)
BASOPHILS NFR BLD AUTO: 0 % (ref 0–1)
EOSINOPHIL # BLD AUTO: 0.11 THOUSAND/ΜL (ref 0–0.61)
EOSINOPHIL NFR BLD AUTO: 2 % (ref 0–6)
ERYTHROCYTE [DISTWIDTH] IN BLOOD BY AUTOMATED COUNT: 15 % (ref 11.6–15.1)
HCT VFR BLD AUTO: 38.3 % (ref 34.8–46.1)
HGB BLD-MCNC: 12.4 G/DL (ref 11.5–15.4)
LYMPHOCYTES # BLD AUTO: 1.67 THOUSANDS/ΜL (ref 0.6–4.47)
LYMPHOCYTES NFR BLD AUTO: 34 % (ref 14–44)
MCH RBC QN AUTO: 27 PG (ref 26.8–34.3)
MCHC RBC AUTO-ENTMCNC: 32.4 G/DL (ref 31.4–37.4)
MCV RBC AUTO: 83 FL (ref 82–98)
MONOCYTES # BLD AUTO: 0.45 THOUSAND/ΜL (ref 0.17–1.22)
MONOCYTES NFR BLD AUTO: 9 % (ref 4–12)
NEUTROPHILS # BLD AUTO: 2.64 THOUSANDS/ΜL (ref 1.85–7.62)
NEUTS SEG NFR BLD AUTO: 55 % (ref 43–75)
PLATELET # BLD AUTO: 281 THOUSANDS/UL (ref 149–390)
PMV BLD AUTO: 11.5 FL (ref 8.9–12.7)
RBC # BLD AUTO: 4.6 MILLION/UL (ref 3.81–5.12)
WBC # BLD AUTO: 4.88 THOUSAND/UL (ref 4.31–10.16)

## 2017-10-25 PROCEDURE — 36415 COLL VENOUS BLD VENIPUNCTURE: CPT | Performed by: EMERGENCY MEDICINE

## 2017-10-25 PROCEDURE — 87207 SMEAR SPECIAL STAIN: CPT | Performed by: EMERGENCY MEDICINE

## 2017-10-25 PROCEDURE — 86618 LYME DISEASE ANTIBODY: CPT | Performed by: EMERGENCY MEDICINE

## 2017-10-25 PROCEDURE — 86140 C-REACTIVE PROTEIN: CPT | Performed by: EMERGENCY MEDICINE

## 2017-10-25 PROCEDURE — 80053 COMPREHEN METABOLIC PANEL: CPT | Performed by: EMERGENCY MEDICINE

## 2017-10-25 PROCEDURE — 81003 URINALYSIS AUTO W/O SCOPE: CPT | Performed by: EMERGENCY MEDICINE

## 2017-10-25 PROCEDURE — 85025 COMPLETE CBC W/AUTO DIFF WBC: CPT | Performed by: EMERGENCY MEDICINE

## 2017-10-25 PROCEDURE — 82550 ASSAY OF CK (CPK): CPT | Performed by: EMERGENCY MEDICINE

## 2017-10-25 PROCEDURE — 83605 ASSAY OF LACTIC ACID: CPT | Performed by: EMERGENCY MEDICINE

## 2017-10-25 RX ORDER — IRBESARTAN 150 MG/1
150 TABLET ORAL
COMMUNITY
End: 2018-12-14 | Stop reason: HOSPADM

## 2017-10-26 LAB
% PARASITEMIA: 0
ALBUMIN SERPL BCP-MCNC: 3.6 G/DL (ref 3.5–5)
ALP SERPL-CCNC: 150 U/L (ref 46–116)
ALT SERPL W P-5'-P-CCNC: 72 U/L (ref 12–78)
ANION GAP SERPL CALCULATED.3IONS-SCNC: 10 MMOL/L (ref 4–13)
AST SERPL W P-5'-P-CCNC: 39 U/L (ref 5–45)
BILIRUB SERPL-MCNC: 0.4 MG/DL (ref 0.2–1)
BILIRUB UR QL STRIP: NEGATIVE
BUN SERPL-MCNC: 17 MG/DL (ref 5–25)
CALCIUM SERPL-MCNC: 8.7 MG/DL (ref 8.3–10.1)
CHLORIDE SERPL-SCNC: 99 MMOL/L (ref 100–108)
CK SERPL-CCNC: 70 U/L (ref 26–192)
CLARITY UR: CLEAR
CO2 SERPL-SCNC: 28 MMOL/L (ref 21–32)
COLOR UR: YELLOW
CREAT SERPL-MCNC: 1.08 MG/DL (ref 0.6–1.3)
CRP SERPL QL: 53.8 MG/L
GFR SERPL CREATININE-BSD FRML MDRD: 66 ML/MIN/1.73SQ M
GLUCOSE SERPL-MCNC: 89 MG/DL (ref 65–140)
GLUCOSE UR STRIP-MCNC: NEGATIVE MG/DL
HGB UR QL STRIP.AUTO: NEGATIVE
KETONES UR STRIP-MCNC: ABNORMAL MG/DL
LACTATE SERPL-SCNC: 1.2 MMOL/L (ref 0.5–2)
LEUKOCYTE ESTERASE UR QL STRIP: NEGATIVE
NITRITE UR QL STRIP: NEGATIVE
PARASITE BLD: NO
PATHOLOGIST INTERPRETATION: NORMAL
PH UR STRIP.AUTO: 6 [PH] (ref 4.5–8)
POTASSIUM SERPL-SCNC: 3.3 MMOL/L (ref 3.5–5.3)
PROT SERPL-MCNC: 8.6 G/DL (ref 6.4–8.2)
PROT UR STRIP-MCNC: NEGATIVE MG/DL
SODIUM SERPL-SCNC: 137 MMOL/L (ref 136–145)
SP GR UR STRIP.AUTO: 1.02 (ref 1–1.03)
UROBILINOGEN UR QL STRIP.AUTO: 0.2 E.U./DL

## 2017-10-26 PROCEDURE — 99283 EMERGENCY DEPT VISIT LOW MDM: CPT

## 2017-10-26 RX ORDER — POTASSIUM CHLORIDE 20 MEQ/1
20 TABLET, EXTENDED RELEASE ORAL ONCE
Status: COMPLETED | OUTPATIENT
Start: 2017-10-26 | End: 2017-10-26

## 2017-10-26 RX ADMIN — POTASSIUM CHLORIDE 20 MEQ: 1500 TABLET, EXTENDED RELEASE ORAL at 00:42

## 2017-10-26 NOTE — DISCHARGE INSTRUCTIONS
Fever in Adults   WHAT YOU NEED TO KNOW:   A fever is an increase in your body temperature  Normal body temperature is 98 6°F (37°C)  Fever is generally defined as greater than 100 4°F (38°C)  Common causes include an infection, injury, or disease such as arthritis  DISCHARGE INSTRUCTIONS:   Return to the emergency department if:   · Your fever does not go away or gets worse even after treatment  · You have a stiff neck and a bad headache  · You are confused  You may not be able to think clearly or remember things like you normally do  · Your heart beats faster than usual even after treatment  · You have shortness of breath or chest pain when you breathe  · You urinate small amounts or not at all  · Your skin, lips, or nails turn blue  Contact your healthcare provider if:   · You have abdominal pain or you feel bloated  · You have nausea or are vomiting  · You have pain or burning when you urinate, or you have pain in your back  · You have questions or concerns about your condition or care  Medicines: You may need any of the following:  · NSAIDs , such as ibuprofen, help decrease swelling, pain, and fever  This medicine is available with or without a doctor's order  NSAIDs can cause stomach bleeding or kidney problems in certain people  If you take blood thinner medicine, always ask if NSAIDs are safe for you  Always read the medicine label and follow directions  Do not give these medicines to children under 10months of age without direction from your child's healthcare provider  · Acetaminophen  decreases pain and fever  It is available without a doctor's order  Ask how much to take and how often to take it  Follow directions  Read the labels of all other medicines you are using to see if they also contain acetaminophen, or ask your doctor or pharmacist  Acetaminophen can cause liver damage if not taken correctly   Do not use more than 4 grams (4,000 milligrams) total of acetaminophen in one day  · Antibiotics  may be given if you have an infection caused by bacteria  · Take your medicine as directed  Contact your healthcare provider if you think your medicine is not helping or if you have side effects  Tell him of her if you are allergic to any medicine  Keep a list of the medicines, vitamins, and herbs you take  Include the amounts, and when and why you take them  Bring the list or the pill bottles to follow-up visits  Carry your medicine list with you in case of an emergency  Follow up with your healthcare provider as directed:  Write down your questions so you remember to ask them during your visits  Self-care:   · Drink more liquids as directed  A fever makes you sweat  This can increase your risk for dehydration  Liquids can help prevent dehydration  ¨ Drink at least 6 to 8 eight-ounce cups of clear liquids each day  Drink water, juice, or broth  Do not drink sports drinks  They may contain caffeine  ¨ Ask your healthcare provider if you should drink an oral rehydration solution (ORS)  An ORS has the right amounts of water, salts, and sugar you need to replace body fluids  · Dress in lightweight clothes  Shivers may be a sign that your fever is rising  Do not put extra blankets or clothes on  This may cause your fever to rise even higher  Dress in light, comfortable clothing  Use a lightweight blanket or sheet when you sleep  Change your clothes, blanket, or sheets if they get wet  · Cool yourself safely  Take a bath in cool or lukewarm water  Use an ice pack wrapped in a small towel or wet a washcloth with cool water  Place the ice pack or wet washcloth on your forehead or the back of your neck  © 2017 [address] [employer] Information is for End User's use only and may not be sold, redistributed or otherwise used for commercial purposes   All illustrations and images included in CareNotes® are the copyrighted property of [employer]  or [employer]  The above information is an  only  It is not intended as medical advice for individual conditions or treatments  Talk to your doctor, nurse or pharmacist before following any medical regimen to see if it is safe and effective for you

## 2017-10-26 NOTE — ED PROVIDER NOTES
History  Chief Complaint   Patient presents with    Fever - 9 weeks to 74 years     pt has had a fever since Saturday complains of chills and aches     75-year-old female with a history of an unclear hematological issue presents with intermittent fevers over the past 5 days  Patient states that she has had body aches associated with the fevers but denies any other symptoms  She states these get improved with the administration of naproxen  She denies any symptoms at present but was concerned due to her prior history where she was hospitalized after an influenza immunization with diffuse swelling of her lymph nodes that they evaluated her for lymphoma  The patient states that she did not have lymphoma was told by the infectious disease doctor that she had a low immune system and she should not get additional immunizations  The patient did not have any recent vaccinations or changes to her medications  The patient did have travel to [place] in July  She denies any cyclically to the fevers  Impression and plan:  Fever of unknown origin  Patient has no other symptoms at present  Patient is afebrile in the emergency room and she last had antipyretics at noon  Considering patient's history of possible immunological issues, will obtain laboratory evaluation including CBC to evaluate for leukocytosis, CRP to evaluate for inflammatory response, metabolic evaluation and send a parasite smear considering recent international travel  I explained the patient this testing will not be complete prior to likely discharge tonight  Patient has follow-up with her primary care physician tomorrow                  Fever - 9 weeks to 74 years   Severity:  Moderate  Onset quality:  Gradual  Timing:  Intermittent  Progression:  Waxing and waning  Chronicity:  New  Worsened by:  Nothing  Ineffective treatments:  None tried  Associated symptoms: no chest pain, no chills, no confusion, no congestion, no cough, no diarrhea, no dysuria, no ear pain, no headaches, no myalgias, no nausea, no rash, no rhinorrhea, no somnolence, no sore throat and no vomiting        Prior to Admission Medications   Prescriptions Last Dose Informant Patient Reported? Taking? albuterol (PROVENTIL HFA,VENTOLIN HFA) 90 mcg/act inhaler   Yes Yes   Sig: Inhale 2 puffs every 6 (six) hours as needed for wheezing   budesonide-formoterol (SYMBICORT) 160-4 5 mcg/act inhaler   Yes Yes   Sig: Inhale 2 puffs 2 (two) times a day   cloNIDine (CATAPRES) 0 1 mg tablet   No Yes   Sig: Take 1 tablet by mouth 2 (two) times a day for 30 days   hydrochlorothiazide (HYDRODIURIL) 25 mg tablet   Yes Yes   Sig: Take 25 mg by mouth every other day     irbesartan (AVAPRO) 150 mg tablet   Yes Yes   Sig: Take 150 mg by mouth daily at bedtime   losartan (COZAAR) 100 MG tablet   Yes No   Sig: Take 100 mg by mouth daily   montelukast (SINGULAIR) 10 mg tablet   Yes Yes   Sig: Take 10 mg by mouth daily at bedtime   potassium chloride (K-DUR,KLOR-CON) 10 mEq tablet   Yes Yes   Sig: Take 10 mEq by mouth daily     thiamine 100 MG tablet   Yes Yes   Sig: Take 100 mg by mouth daily   verapamil (VERELAN PM) 360 MG 24 hr capsule   Yes Yes   Sig: Take 360 mg by mouth daily        Facility-Administered Medications: None       Past Medical History:   Diagnosis Date    Asthma     Disease of thyroid gland     Hypertension        Past Surgical History:   Procedure Laterality Date    CARPAL TUNNEL RELEASE       SECTION      4 c section    HYSTERECTOMY      LAPAROSCOPIC GASTRIC BANDING      VOCAL CORD LATERALIZATION, ENDOSCOPIC APPROACH W/ MLB         Family History   Problem Relation Age of Onset    Family history unknown: Yes     I have reviewed and agree with the history as documented  Social History   Substance Use Topics    Smoking status: Never Smoker    Smokeless tobacco: Never Used    Alcohol use No        Review of Systems   Constitutional: Positive for fever  Negative for chills  HENT: Negative for congestion, ear pain, rhinorrhea and sore throat  Respiratory: Negative for cough  Cardiovascular: Negative for chest pain  Gastrointestinal: Negative for abdominal pain, diarrhea, nausea and vomiting  Genitourinary: Negative for dysuria and flank pain  Musculoskeletal: Negative for back pain, joint swelling, myalgias, neck pain and neck stiffness  Skin: Negative for rash  Neurological: Negative for headaches  Psychiatric/Behavioral: Negative for confusion  All other systems reviewed and are negative  Physical Exam  ED Triage Vitals [10/25/17 2246]   Temperature Pulse Respirations Blood Pressure SpO2   98 7 °F (37 1 °C) (!) 108 18 119/81 98 %      Temp Source Heart Rate Source Patient Position - Orthostatic VS BP Location FiO2 (%)   Oral Monitor Sitting Right arm --      Pain Score       No Pain           Orthostatic Vital Signs  Vitals:    10/25/17 2246   BP: 119/81   Pulse: (!) 108   Patient Position - Orthostatic VS: Sitting       Physical Exam   Constitutional: She appears well-developed and well-nourished  HENT:   Mouth/Throat: Oropharynx is clear and moist    Eyes: Conjunctivae and EOM are normal  Pupils are equal, round, and reactive to light  Neck: Normal range of motion  Neck supple  No thyromegaly present  No meningeal signs  Cardiovascular: Normal rate and regular rhythm  Exam reveals no friction rub  No murmur heard  Pulmonary/Chest: Effort normal and breath sounds normal  No stridor  No respiratory distress  She has no wheezes  She has no rales  Abdominal: Soft  Bowel sounds are normal  She exhibits no distension and no mass  There is no tenderness  There is no rebound and no guarding  Musculoskeletal: She exhibits no edema or tenderness  Neurological: She is alert  Skin: Skin is warm and dry  Psychiatric: She has a normal mood and affect  Vitals reviewed        ED Medications  Medications   potassium chloride (K-DUR,KLOR-CON) CR tablet 20 mEq (20 mEq Oral Given 10/26/17 0042)       Diagnostic Studies  Results Reviewed     Procedure Component Value Units Date/Time    C-reactive protein [[id number]]  (Abnormal) Collected:  10/25/17 2337    Lab Status:  Final result Specimen:  Blood from Arm, Right Updated:  10/26/17 0027     CRP 53 8 (H) mg/L     CK [[id number]]  (Normal) Collected:  10/25/17 2337    Lab Status:  Final result Specimen:  Blood from Arm, Right Updated:  10/26/17 0027     Total CK 70 U/L     Lactic acid, plasma [[id number]]  (Normal) Collected:  10/25/17 2337    Lab Status:  Final result Specimen:  Blood from Arm, Right Updated:  10/26/17 0016     LACTIC ACID 1 2 mmol/L     Narrative:         Result may be elevated if tourniquet was used during collection      UA w Reflex to Microscopic [[id number]]  (Abnormal) Collected:  10/25/17 2353    Lab Status:  Final result Specimen:  Urine from Urine, Clean Catch Updated:  10/26/17 0005     Color, UA Yellow     Clarity, UA Clear     Specific Gravity, UA 1 025     pH, UA 6 0     Leukocytes, UA Negative     Nitrite, UA Negative     Protein, UA Negative mg/dl      Glucose, UA Negative mg/dl      Ketones, UA Trace (A) mg/dl      Urobilinogen, UA 0 2 E U /dl      Bilirubin, UA Negative     Blood, UA Negative    Comprehensive metabolic panel [[id number]]  (Abnormal) Collected:  10/25/17 2337    Lab Status:  Final result Specimen:  Blood from Arm, Right Updated:  10/26/17 0003     Sodium 137 mmol/L      Potassium 3 3 (L) mmol/L      Chloride 99 (L) mmol/L      CO2 28 mmol/L      Anion Gap 10 mmol/L      BUN 17 mg/dL      Creatinine 1 08 mg/dL      Glucose 89 mg/dL      Calcium 8 7 mg/dL      AST 39 U/L      ALT 72 U/L      Alkaline Phosphatase 150 (H) U/L      Total Protein 8 6 (H) g/dL      Albumin 3 6 g/dL      Total Bilirubin 0 40 mg/dL      eGFR 66 ml/min/1 73sq m     Narrative:         National Kidney Disease Education Program recommendations are as follows:  GFR calculation is accurate only with a steady state creatinine  Chronic Kidney disease less than 60 ml/min/1 73 sq  meters  Kidney failure less than 15 ml/min/1 73 sq  meters  CBC and differential [[id number]]  (Normal) Collected:  10/25/17 2337    Lab Status:  Final result Specimen:  Blood from Arm, Right Updated:  10/25/17 2344     WBC 4 88 Thousand/uL      RBC 4 60 Million/uL      Hemoglobin 12 4 g/dL      Hematocrit 38 3 %      MCV 83 fL      MCH 27 0 pg      MCHC 32 4 g/dL      RDW 15 0 %      MPV 11 5 fL      Platelets 090 Thousands/uL      Neutrophils Relative 55 %      Lymphocytes Relative 34 %      Monocytes Relative 9 %      Eosinophils Relative 2 %      Basophils Relative 0 %      Neutrophils Absolute 2 64 Thousands/µL      Lymphocytes Absolute 1 67 Thousands/µL      Monocytes Absolute 0 45 Thousand/µL      Eosinophils Absolute 0 11 Thousand/µL      Basophils Absolute 0 01 Thousands/µL     Lyme Antibody Profile with reflex to [facility] [[id number]] Collected:  10/25/17 2337    Lab Status: In process Specimen:  Blood from Arm, Right Updated:  10/25/17 2342    Blood Parasite smear [[id number]] Collected:  10/25/17 2337    Lab Status: In process Specimen:  Blood from Arm, Right Updated:  10/25/17 2341                 No orders to display              Procedures  Procedures       Phone Contacts  ED Phone Contact    ED Course  ED Course as of Oct 26 0056   Thu Oct 26, 2017   3501 Discussed the findings with the patient  Discussed the elevated CRP in the possibilities associated with this  Offered additional diagnostic testing including lumbar puncture, which patient declined after discussion of the risks and benefits  Patient has follow-up with her primary care physician in the morning  I discussed return precautions in detail  Provide laboratory information from tonight for her family physician                                  [facility]  ElishatCjimmie Time    Disposition  Final diagnoses:   Fever   Hypokalemia     Time reflects when diagnosis was documented in both MDM as applicable and the Disposition within this note     Time User Action Codes Description Comment    10/26/2017 12:23 AM Stefanie Rosales Add [R50 9] Fever     10/26/2017 12:24 AM Stefanie Hernandez [E87 6] Hypokalemia       ED Disposition     ED Disposition Condition Comment    Discharge  Chantel Chen discharge to home/self care  Condition at discharge: stable      Follow-up Information     Follow up With Specialties Details Why Rachel Hairston MD Internal Medicine Go today Follow up appointment and recheck potassium  [address] [facility] [id number] [phone]          Patient's Medications   Discharge Prescriptions    No medications on file     No discharge procedures on file      ED Provider  Electronically Signed by           Janet Venegas MD  10/26/17 [facility] Honorio Covarrubias MD  10/26/17 6556

## 2017-10-27 LAB
B BURGDOR IGG SER IA-ACNC: 0.44
B BURGDOR IGM SER IA-ACNC: 0.21

## 2018-01-13 VITALS
SYSTOLIC BLOOD PRESSURE: 144 MMHG | BODY MASS INDEX: 40.25 KG/M2 | WEIGHT: 227.19 LBS | DIASTOLIC BLOOD PRESSURE: 92 MMHG | HEIGHT: 63 IN | OXYGEN SATURATION: 98 % | HEART RATE: 64 BPM

## 2018-01-14 VITALS
HEIGHT: 63 IN | SYSTOLIC BLOOD PRESSURE: 136 MMHG | WEIGHT: 238.19 LBS | BODY MASS INDEX: 42.2 KG/M2 | DIASTOLIC BLOOD PRESSURE: 88 MMHG | OXYGEN SATURATION: 97 % | HEART RATE: 94 BPM

## 2018-12-13 ENCOUNTER — APPOINTMENT (EMERGENCY)
Dept: RADIOLOGY | Facility: HOSPITAL | Age: 38
DRG: 202 | End: 2018-12-13

## 2018-12-13 ENCOUNTER — APPOINTMENT (INPATIENT)
Dept: NON INVASIVE DIAGNOSTICS | Facility: HOSPITAL | Age: 38
DRG: 202 | End: 2018-12-13

## 2018-12-13 ENCOUNTER — HOSPITAL ENCOUNTER (INPATIENT)
Facility: HOSPITAL | Age: 38
LOS: 1 days | Discharge: HOME | DRG: 202 | End: 2018-12-14
Attending: EMERGENCY MEDICINE | Admitting: INTERNAL MEDICINE

## 2018-12-13 ENCOUNTER — APPOINTMENT (EMERGENCY)
Dept: CT IMAGING | Facility: HOSPITAL | Age: 38
DRG: 202 | End: 2018-12-13

## 2018-12-13 DIAGNOSIS — J45.909 ASTHMA, UNSPECIFIED ASTHMA SEVERITY, UNSPECIFIED WHETHER COMPLICATED, UNSPECIFIED WHETHER PERSISTENT: Chronic | ICD-10-CM

## 2018-12-13 DIAGNOSIS — R77.8 ELEVATED TROPONIN: ICD-10-CM

## 2018-12-13 DIAGNOSIS — R06.01 ORTHOPNEA: ICD-10-CM

## 2018-12-13 DIAGNOSIS — I10 ESSENTIAL HYPERTENSION: Chronic | ICD-10-CM

## 2018-12-13 DIAGNOSIS — R93.89 ABNORMAL CT OF THE CHEST: ICD-10-CM

## 2018-12-13 DIAGNOSIS — R07.9 CHEST PAIN: ICD-10-CM

## 2018-12-13 DIAGNOSIS — R06.00 PND (PAROXYSMAL NOCTURNAL DYSPNEA): ICD-10-CM

## 2018-12-13 DIAGNOSIS — E03.9 ACQUIRED HYPOTHYROIDISM: ICD-10-CM

## 2018-12-13 DIAGNOSIS — R06.00 DYSPNEA: Primary | ICD-10-CM

## 2018-12-13 LAB
ALBUMIN SERPL BCP-MCNC: 3.4 G/DL (ref 3.5–5)
ALP SERPL-CCNC: 136 U/L (ref 46–116)
ALT SERPL W P-5'-P-CCNC: 118 U/L (ref 12–78)
ANION GAP SERPL CALCULATED.3IONS-SCNC: 5 MMOL/L (ref 4–13)
AST SERPL W P-5'-P-CCNC: 71 U/L (ref 5–45)
ATRIAL RATE: 102 BPM
BASOPHILS # BLD AUTO: 0.04 THOUSANDS/ΜL (ref 0–0.1)
BASOPHILS NFR BLD AUTO: 1 % (ref 0–1)
BILIRUB SERPL-MCNC: 0.3 MG/DL (ref 0.2–1)
BUN SERPL-MCNC: 13 MG/DL (ref 5–25)
CALCIUM SERPL-MCNC: 8.9 MG/DL (ref 8.3–10.1)
CHLORIDE SERPL-SCNC: 104 MMOL/L (ref 100–108)
CO2 SERPL-SCNC: 30 MMOL/L (ref 21–32)
CREAT SERPL-MCNC: 0.97 MG/DL (ref 0.6–1.3)
DEPRECATED D DIMER PPP: 1312 NG/ML (FEU)
EOSINOPHIL # BLD AUTO: 0.16 THOUSAND/ΜL (ref 0–0.61)
EOSINOPHIL NFR BLD AUTO: 3 % (ref 0–6)
ERYTHROCYTE [DISTWIDTH] IN BLOOD BY AUTOMATED COUNT: 14.8 % (ref 11.6–15.1)
GFR SERPL CREATININE-BSD FRML MDRD: 74 ML/MIN/1.73SQ M
GLUCOSE SERPL-MCNC: 137 MG/DL (ref 65–140)
HCG SERPL QL: NEGATIVE
HCT VFR BLD AUTO: 40.6 % (ref 34.8–46.1)
HGB BLD-MCNC: 12.9 G/DL (ref 11.5–15.4)
IMM GRANULOCYTES # BLD AUTO: 0.02 THOUSAND/UL (ref 0–0.2)
IMM GRANULOCYTES NFR BLD AUTO: 0 % (ref 0–2)
LYMPHOCYTES # BLD AUTO: 2.23 THOUSANDS/ΜL (ref 0.6–4.47)
LYMPHOCYTES NFR BLD AUTO: 40 % (ref 14–44)
MCH RBC QN AUTO: 26 PG (ref 26.8–34.3)
MCHC RBC AUTO-ENTMCNC: 31.8 G/DL (ref 31.4–37.4)
MCV RBC AUTO: 82 FL (ref 82–98)
MONOCYTES # BLD AUTO: 0.25 THOUSAND/ΜL (ref 0.17–1.22)
MONOCYTES NFR BLD AUTO: 5 % (ref 4–12)
NEUTROPHILS # BLD AUTO: 2.86 THOUSANDS/ΜL (ref 1.85–7.62)
NEUTS SEG NFR BLD AUTO: 51 % (ref 43–75)
NRBC BLD AUTO-RTO: 0 /100 WBCS
NT-PROBNP SERPL-MCNC: 624 PG/ML
P AXIS: 66 DEGREES
PLATELET # BLD AUTO: 258 THOUSANDS/UL (ref 149–390)
PMV BLD AUTO: 12.1 FL (ref 8.9–12.7)
POTASSIUM SERPL-SCNC: 3.3 MMOL/L (ref 3.5–5.3)
PR INTERVAL: 130 MS
PROCALCITONIN SERPL-MCNC: <0.05 NG/ML
PROT SERPL-MCNC: 8.3 G/DL (ref 6.4–8.2)
QRS AXIS: 23 DEGREES
QRSD INTERVAL: 76 MS
QT INTERVAL: 390 MS
QTC INTERVAL: 508 MS
RBC # BLD AUTO: 4.97 MILLION/UL (ref 3.81–5.12)
SODIUM SERPL-SCNC: 139 MMOL/L (ref 136–145)
T WAVE AXIS: 176 DEGREES
T3FREE SERPL-MCNC: 2.39 PG/ML (ref 2.3–4.2)
T4 FREE SERPL-MCNC: 0.87 NG/DL (ref 0.76–1.46)
TROPONIN I SERPL-MCNC: 0.05 NG/ML
TROPONIN I SERPL-MCNC: <0.02 NG/ML
TSH SERPL DL<=0.05 MIU/L-ACNC: 7.34 UIU/ML (ref 0.36–3.74)
VENTRICULAR RATE: 102 BPM
WBC # BLD AUTO: 5.56 THOUSAND/UL (ref 4.31–10.16)

## 2018-12-13 PROCEDURE — 84439 ASSAY OF FREE THYROXINE: CPT | Performed by: EMERGENCY MEDICINE

## 2018-12-13 PROCEDURE — 84145 PROCALCITONIN (PCT): CPT | Performed by: INTERNAL MEDICINE

## 2018-12-13 PROCEDURE — 93010 ELECTROCARDIOGRAM REPORT: CPT | Performed by: INTERNAL MEDICINE

## 2018-12-13 PROCEDURE — 99291 CRITICAL CARE FIRST HOUR: CPT

## 2018-12-13 PROCEDURE — 93306 TTE W/DOPPLER COMPLETE: CPT | Performed by: INTERNAL MEDICINE

## 2018-12-13 PROCEDURE — 84484 ASSAY OF TROPONIN QUANT: CPT | Performed by: EMERGENCY MEDICINE

## 2018-12-13 PROCEDURE — 80053 COMPREHEN METABOLIC PANEL: CPT | Performed by: EMERGENCY MEDICINE

## 2018-12-13 PROCEDURE — 71275 CT ANGIOGRAPHY CHEST: CPT

## 2018-12-13 PROCEDURE — 85025 COMPLETE CBC W/AUTO DIFF WBC: CPT | Performed by: EMERGENCY MEDICINE

## 2018-12-13 PROCEDURE — 84481 FREE ASSAY (FT-3): CPT | Performed by: PHYSICIAN ASSISTANT

## 2018-12-13 PROCEDURE — 93306 TTE W/DOPPLER COMPLETE: CPT

## 2018-12-13 PROCEDURE — 84703 CHORIONIC GONADOTROPIN ASSAY: CPT | Performed by: EMERGENCY MEDICINE

## 2018-12-13 PROCEDURE — 99254 IP/OBS CNSLTJ NEW/EST MOD 60: CPT | Performed by: INTERNAL MEDICINE

## 2018-12-13 PROCEDURE — 84443 ASSAY THYROID STIM HORMONE: CPT | Performed by: EMERGENCY MEDICINE

## 2018-12-13 PROCEDURE — 83880 ASSAY OF NATRIURETIC PEPTIDE: CPT | Performed by: EMERGENCY MEDICINE

## 2018-12-13 PROCEDURE — 36415 COLL VENOUS BLD VENIPUNCTURE: CPT | Performed by: EMERGENCY MEDICINE

## 2018-12-13 PROCEDURE — 93005 ELECTROCARDIOGRAM TRACING: CPT

## 2018-12-13 PROCEDURE — 94640 AIRWAY INHALATION TREATMENT: CPT

## 2018-12-13 PROCEDURE — 71046 X-RAY EXAM CHEST 2 VIEWS: CPT

## 2018-12-13 PROCEDURE — 85379 FIBRIN DEGRADATION QUANT: CPT | Performed by: EMERGENCY MEDICINE

## 2018-12-13 PROCEDURE — 84484 ASSAY OF TROPONIN QUANT: CPT | Performed by: PHYSICIAN ASSISTANT

## 2018-12-13 PROCEDURE — 96374 THER/PROPH/DIAG INJ IV PUSH: CPT

## 2018-12-13 PROCEDURE — 99223 1ST HOSP IP/OBS HIGH 75: CPT | Performed by: INTERNAL MEDICINE

## 2018-12-13 RX ORDER — ALBUTEROL SULFATE 2.5 MG/3ML
5 SOLUTION RESPIRATORY (INHALATION) ONCE
Status: COMPLETED | OUTPATIENT
Start: 2018-12-13 | End: 2018-12-13

## 2018-12-13 RX ORDER — CLONIDINE HYDROCHLORIDE 0.1 MG/1
0.1 TABLET ORAL 2 TIMES DAILY
Status: DISCONTINUED | OUTPATIENT
Start: 2018-12-13 | End: 2018-12-14 | Stop reason: HOSPADM

## 2018-12-13 RX ORDER — MONTELUKAST SODIUM 10 MG/1
10 TABLET ORAL
Status: DISCONTINUED | OUTPATIENT
Start: 2018-12-13 | End: 2018-12-13

## 2018-12-13 RX ORDER — FUROSEMIDE 10 MG/ML
40 INJECTION INTRAMUSCULAR; INTRAVENOUS ONCE
Status: COMPLETED | OUTPATIENT
Start: 2018-12-13 | End: 2018-12-13

## 2018-12-13 RX ORDER — LEVOTHYROXINE SODIUM 0.07 MG/1
75 TABLET ORAL
Status: DISCONTINUED | OUTPATIENT
Start: 2018-12-13 | End: 2018-12-14 | Stop reason: HOSPADM

## 2018-12-13 RX ORDER — HYDROCHLOROTHIAZIDE 25 MG/1
25 TABLET ORAL EVERY OTHER DAY
Status: DISCONTINUED | OUTPATIENT
Start: 2018-12-13 | End: 2018-12-14 | Stop reason: HOSPADM

## 2018-12-13 RX ORDER — THIAMINE MONONITRATE (VIT B1) 100 MG
100 TABLET ORAL DAILY
Status: DISCONTINUED | OUTPATIENT
Start: 2018-12-13 | End: 2018-12-14 | Stop reason: HOSPADM

## 2018-12-13 RX ORDER — ALBUTEROL SULFATE 2.5 MG/3ML
2.5 SOLUTION RESPIRATORY (INHALATION) EVERY 6 HOURS PRN
Status: DISCONTINUED | OUTPATIENT
Start: 2018-12-13 | End: 2018-12-14 | Stop reason: HOSPADM

## 2018-12-13 RX ORDER — METHYLPREDNISOLONE SODIUM SUCCINATE 40 MG/ML
40 INJECTION, POWDER, LYOPHILIZED, FOR SOLUTION INTRAMUSCULAR; INTRAVENOUS EVERY 8 HOURS SCHEDULED
Status: DISCONTINUED | OUTPATIENT
Start: 2018-12-13 | End: 2018-12-14

## 2018-12-13 RX ORDER — LABETALOL HYDROCHLORIDE 5 MG/ML
10 INJECTION, SOLUTION INTRAVENOUS ONCE
Status: COMPLETED | OUTPATIENT
Start: 2018-12-13 | End: 2018-12-13

## 2018-12-13 RX ORDER — LABETALOL HYDROCHLORIDE 5 MG/ML
10 INJECTION, SOLUTION INTRAVENOUS ONCE
Status: DISCONTINUED | OUTPATIENT
Start: 2018-12-13 | End: 2018-12-13

## 2018-12-13 RX ORDER — MONTELUKAST SODIUM 10 MG/1
10 TABLET ORAL DAILY
Status: DISCONTINUED | OUTPATIENT
Start: 2018-12-13 | End: 2018-12-14 | Stop reason: HOSPADM

## 2018-12-13 RX ORDER — POTASSIUM CHLORIDE 14.9 MG/ML
20 INJECTION INTRAVENOUS ONCE
Status: COMPLETED | OUTPATIENT
Start: 2018-12-13 | End: 2018-12-13

## 2018-12-13 RX ORDER — HEPARIN SODIUM 5000 [USP'U]/ML
5000 INJECTION, SOLUTION INTRAVENOUS; SUBCUTANEOUS EVERY 8 HOURS SCHEDULED
Status: DISCONTINUED | OUTPATIENT
Start: 2018-12-13 | End: 2018-12-14 | Stop reason: HOSPADM

## 2018-12-13 RX ORDER — BUDESONIDE AND FORMOTEROL FUMARATE DIHYDRATE 160; 4.5 UG/1; UG/1
2 AEROSOL RESPIRATORY (INHALATION) 2 TIMES DAILY
Status: DISCONTINUED | OUTPATIENT
Start: 2018-12-13 | End: 2018-12-14 | Stop reason: HOSPADM

## 2018-12-13 RX ADMIN — ALBUTEROL SULFATE 5 MG: 2.5 SOLUTION RESPIRATORY (INHALATION) at 04:12

## 2018-12-13 RX ADMIN — METHYLPREDNISOLONE SODIUM SUCCINATE 40 MG: 40 INJECTION, POWDER, FOR SOLUTION INTRAMUSCULAR; INTRAVENOUS at 21:15

## 2018-12-13 RX ADMIN — HEPARIN SODIUM 5000 UNITS: 5000 INJECTION, SOLUTION INTRAVENOUS; SUBCUTANEOUS at 08:17

## 2018-12-13 RX ADMIN — MONTELUKAST SODIUM 10 MG: 10 TABLET, FILM COATED ORAL at 15:48

## 2018-12-13 RX ADMIN — LEVOTHYROXINE SODIUM 75 MCG: 75 TABLET ORAL at 08:17

## 2018-12-13 RX ADMIN — METHYLPREDNISOLONE SODIUM SUCCINATE 40 MG: 40 INJECTION, POWDER, FOR SOLUTION INTRAMUSCULAR; INTRAVENOUS at 15:49

## 2018-12-13 RX ADMIN — VERAPAMIL HYDROCHLORIDE 120 MG: 120 TABLET, FILM COATED, EXTENDED RELEASE ORAL at 10:12

## 2018-12-13 RX ADMIN — HEPARIN SODIUM 5000 UNITS: 5000 INJECTION, SOLUTION INTRAVENOUS; SUBCUTANEOUS at 15:48

## 2018-12-13 RX ADMIN — LABETALOL 20 MG/4 ML (5 MG/ML) INTRAVENOUS SYRINGE 10 MG: at 05:19

## 2018-12-13 RX ADMIN — IPRATROPIUM BROMIDE 0.5 MG: 0.5 SOLUTION RESPIRATORY (INHALATION) at 04:12

## 2018-12-13 RX ADMIN — HEPARIN SODIUM 5000 UNITS: 5000 INJECTION, SOLUTION INTRAVENOUS; SUBCUTANEOUS at 21:15

## 2018-12-13 RX ADMIN — FUROSEMIDE 40 MG: 10 INJECTION, SOLUTION INTRAMUSCULAR; INTRAVENOUS at 08:16

## 2018-12-13 RX ADMIN — METHYLPREDNISOLONE SODIUM SUCCINATE 40 MG: 40 INJECTION, POWDER, FOR SOLUTION INTRAMUSCULAR; INTRAVENOUS at 08:17

## 2018-12-13 RX ADMIN — AZITHROMYCIN MONOHYDRATE 500 MG: 500 INJECTION, POWDER, LYOPHILIZED, FOR SOLUTION INTRAVENOUS at 08:18

## 2018-12-13 RX ADMIN — IOHEXOL 85 ML: 350 INJECTION, SOLUTION INTRAVENOUS at 06:37

## 2018-12-13 RX ADMIN — BUDESONIDE AND FORMOTEROL FUMARATE DIHYDRATE 2 PUFF: 160; 4.5 AEROSOL RESPIRATORY (INHALATION) at 10:12

## 2018-12-13 RX ADMIN — THIAMINE HCL TAB 100 MG 100 MG: 100 TAB at 08:17

## 2018-12-13 RX ADMIN — CLONIDINE HYDROCHLORIDE 0.1 MG: 0.1 TABLET ORAL at 08:17

## 2018-12-13 RX ADMIN — HYDROCHLOROTHIAZIDE 25 MG: 25 TABLET ORAL at 10:12

## 2018-12-13 RX ADMIN — Medication 1000 MG: at 11:04

## 2018-12-13 RX ADMIN — POTASSIUM CHLORIDE 20 MEQ: 14.9 INJECTION, SOLUTION INTRAVENOUS at 12:15

## 2018-12-13 RX ADMIN — CLONIDINE HYDROCHLORIDE 0.1 MG: 0.1 TABLET ORAL at 18:03

## 2018-12-13 RX ADMIN — BUDESONIDE AND FORMOTEROL FUMARATE DIHYDRATE 2 PUFF: 160; 4.5 AEROSOL RESPIRATORY (INHALATION) at 18:44

## 2018-12-13 NOTE — ASSESSMENT & PLAN NOTE
Patient was on ARB as well as HCTZ for HTN  Ran out 2 months ago  ? Volume overload/CHF/Asthma exacerbation  Will give lasix 40 mg IV and follow up clinically  Will get Echo

## 2018-12-13 NOTE — RESPIRATORY THERAPY NOTE
RT Protocol Note  Franklin Garibay 45 y o  female MRN: [id number]  Unit/Bed#: ED 24 Encounter: [id number]    Assessment    Principal Problem:    Orthopnea  Active Problems:    Asthma    HTN (hypertension)    Hypothyroidism      Home Pulmonary Medications:  inhaler       Past Medical History:   Diagnosis Date    Asthma     Disease of thyroid gland     Hypertension      Social History     Social History    Marital status: /Civil Union     Spouse name: N/A    Number of children: N/A    Years of education: N/A     Social History Main Topics    Smoking status: Never Smoker    Smokeless tobacco: Never Used    Alcohol use No    Drug use: No    Sexual activity: Yes     Partners: Male     Other Topics Concern    None     Social History Narrative    None       Subjective    Subjective Data: patient awake and alert without apparent respiratory distress at this time     Objective    Physical Exam:   Assessment Type: Assess only  General Appearance: Alert, Awake  Respiratory Pattern: Normal, Spontaneous, Dyspnea with exertion  Chest Assessment: Chest expansion symmetrical  Bilateral Breath Sounds: Clear, Diminished (slightly decreased no great adverse breath sounds noted)  Cough: None  O2 Device: room air    Vitals:  Blood pressure 138/88, pulse 94, temperature 98 3 °F (36 8 °C), temperature source Oral, resp  rate 19, weight 112 kg (245 lb 13 oz), SpO2 94 %  Imaging and other studies: I have personally reviewed pertinent reports        O2 Device: room air     Plan    Respiratory Plan: Home Bronchodilator Patient pathway        Resp Comments: respiratory protocol completed at this time patient states she is not having an asthma exacerbation and feels it is more her heart she is refusing aerosol treatments at this time

## 2018-12-13 NOTE — ED PROVIDER NOTES
History  Chief Complaint   Patient presents with    Shortness of Breath     "feels pressure when laying down and SOB   has not been able to take medications for a few months due to no insurance"     78-year-old female presents with a chief complaint of "I hear the gargling of the water when I'm breathing" that the patient further characterizes as orthopnea, noting the symptoms occur only with supine positioning and mainly at night while attempting sleeping  Patient notes attempted treatment with 3 pillows which modestly improved symptoms but she notes persistent recurrence of the symptoms  Patient reportedly notes a history of asthma and has been attempting treatment for asthma but denies any wheezing or other symptoms that would be consistent with asthma exacerbation  Patient does note "I start feeling heavy in the chest" with exertional activities and during her episodes of orthopnea  Patient denies any chest pain at present, noting symptoms occur mainly with supine positioning and occasionally with exertional activities  She notes the symptoms resolve with upright positioning though they have limited her ability to sleep  Patient denies any history of congestive heart failure  Impression and plan:  Orthopnea with a broad differential   Based on patient's history and physical, concerns for potential exacerbation of undiagnosed congestive heart failure  Less likely sleep apnea as patient notes they occur with any supine positioning including while awake  Unusual presentation as patient does not appear fluid overloaded at present  Patient does have a mildly elevated BNP on laboratory evaluation  Patient had prior echocardiogram that was the unremarkable    Discussed potential options with the patient and considering patient's young age and limited ability to follow up due to lack of insurance, will place patient in observation for continued monitoring and evaluation with likely repeat echocardiogram   I do not believe this is likely an asthma exacerbation, patient was treated with nebulized medications with no improvement in her symptoms and does not have other typical symptoms of asthma exacerbation  History provided by:  Patient  Shortness of Breath   Severity:  Moderate  Onset quality:  Gradual  Duration:  2 weeks  Timing:  Constant  Progression:  Worsening  Chronicity:  New  Relieved by:  None tried  Worsened by:  Exertion (laying down)  Ineffective treatments:  None tried  Associated symptoms: chest pain, cough (only with supine positioning, improves) and PND    Associated symptoms: no abdominal pain, no claudication, no diaphoresis, no ear pain, no fever, no headaches, no hemoptysis, no neck pain, no rash, no sore throat, no sputum production, no syncope, no swollen glands, no vomiting and no wheezing        Prior to Admission Medications   Prescriptions Last Dose Informant Patient Reported? Taking?    albuterol (PROVENTIL HFA,VENTOLIN HFA) 90 mcg/act inhaler   Yes Yes   Sig: Inhale 2 puffs every 6 (six) hours as needed for wheezing   budesonide-formoterol (SYMBICORT) 160-4 5 mcg/act inhaler   Yes Yes   Sig: Inhale 2 puffs 2 (two) times a day   cloNIDine (CATAPRES) 0 1 mg tablet   No Yes   Sig: Take 1 tablet by mouth 2 (two) times a day for 30 days   hydrochlorothiazide (HYDRODIURIL) 25 mg tablet   Yes Yes   Sig: Take 25 mg by mouth every other day     irbesartan (AVAPRO) 150 mg tablet   Yes No   Sig: Take 150 mg by mouth daily at bedtime   losartan (COZAAR) 100 MG tablet   Yes Yes   Sig: Take 100 mg by mouth daily   montelukast (SINGULAIR) 10 mg tablet   Yes Yes   Sig: Take 10 mg by mouth daily at bedtime   potassium chloride (K-DUR,KLOR-CON) 10 mEq tablet   Yes Yes   Sig: Take 10 mEq by mouth daily     thiamine 100 MG tablet   Yes Yes   Sig: Take 100 mg by mouth daily   verapamil (VERELAN PM) 360 MG 24 hr capsule   Yes Yes   Sig: Take 360 mg by mouth daily Facility-Administered Medications: None       Past Medical History:   Diagnosis Date    Asthma     Disease of thyroid gland     Hypertension        Past Surgical History:   Procedure Laterality Date    CARPAL TUNNEL RELEASE       SECTION      4 c section    HYSTERECTOMY      LAPAROSCOPIC GASTRIC BANDING      VOCAL CORD LATERALIZATION, ENDOSCOPIC APPROACH W/ MLB         Family History   Problem Relation Age of Onset    Family history unknown: Yes     I have reviewed and agree with the history as documented  Social History   Substance Use Topics    Smoking status: Never Smoker    Smokeless tobacco: Never Used    Alcohol use No        Review of Systems   Constitutional: Negative for diaphoresis and fever  HENT: Negative for ear pain and sore throat  Respiratory: Positive for cough (only with supine positioning, improves) and shortness of breath  Negative for hemoptysis, sputum production and wheezing  Cardiovascular: Positive for chest pain and PND  Negative for claudication and syncope  Gastrointestinal: Negative for abdominal pain and vomiting  Musculoskeletal: Negative for neck pain  Skin: Negative for rash  Neurological: Negative for headaches  All other systems reviewed and are negative  Physical Exam  Physical Exam   Constitutional: She appears well-developed and well-nourished  No distress  HENT:   Head: Normocephalic and atraumatic  Mouth/Throat: Oropharynx is clear and moist    Eyes: Pupils are equal, round, and reactive to light  Neck: Normal range of motion  Neck supple  Cardiovascular: Normal rate and regular rhythm  Pulmonary/Chest: Effort normal and breath sounds normal  No respiratory distress  She has no wheezes  She has no rales  Abdominal: Soft  Bowel sounds are normal  She exhibits no distension  There is no tenderness  There is no guarding  Musculoskeletal: She exhibits no edema or tenderness     No clinical signs of DVT   Neurological: She is alert  Skin: Skin is warm and dry  She is not diaphoretic  Psychiatric: She has a normal mood and affect  Vitals reviewed        Vital Signs  ED Triage Vitals   Temperature Pulse Respirations Blood Pressure SpO2   12/13/18 0402 12/13/18 0356 12/13/18 0356 12/13/18 0356 12/13/18 0356   98 3 °F (36 8 °C) (!) 111 20 (!) 201/118 95 %      Temp Source Heart Rate Source Patient Position - Orthostatic VS BP Location FiO2 (%)   12/13/18 0402 12/13/18 0356 12/13/18 0356 12/13/18 0356 --   Oral Monitor Sitting Right arm       Pain Score       12/13/18 0356       No Pain           Vitals:    12/13/18 2110 12/13/18 2346 12/14/18 0654 12/14/18 1700   BP: 148/84 149/78 132/73 141/94   Pulse: 101 (!) 111 98 100   Patient Position - Orthostatic VS:  Lying Lying Sitting       Visual Acuity  Visual Acuity      Most Recent Value   L Pupil Size (mm)  3   R Pupil Size (mm)  3   L Pupil Shape  Round   R Pupil Shape  Round          ED Medications  Medications   albuterol inhalation solution 5 mg (5 mg Nebulization Given 12/13/18 0412)   ipratropium (ATROVENT) 0 02 % inhalation solution 0 5 mg (0 5 mg Nebulization Given 12/13/18 0412)   labetalol (NORMODYNE) injection 10 mg (10 mg Intravenous Given 12/13/18 0519)   potassium chloride 20 mEq IVPB (premix) (20 mEq Intravenous New Bag 12/13/18 1215)   furosemide (LASIX) injection 40 mg (40 mg Intravenous Given 12/13/18 0816)   iohexol (OMNIPAQUE) 350 MG/ML injection (MULTI-DOSE) 85 mL (85 mL Intravenous Given 12/13/18 0637)       Diagnostic Studies  Results Reviewed     Procedure Component Value Units Date/Time    Comprehensive metabolic panel [[id number]]  (Abnormal) Collected:  12/14/18 0515    Lab Status:  Final result Specimen:  Blood from Arm, Right Updated:  12/14/18 0604     Sodium 137 mmol/L      Potassium 4 0 mmol/L      Chloride 102 mmol/L      CO2 25 mmol/L      ANION GAP 10 mmol/L      BUN 18 mg/dL      Creatinine 1 08 mg/dL      Glucose 161 (H) mg/dL      Calcium 9 7 mg/dL      AST 49 (H) U/L       (H) U/L      Alkaline Phosphatase 119 (H) U/L      Total Protein 8 2 g/dL      Albumin 3 3 (L) g/dL      Total Bilirubin 0 40 mg/dL      eGFR 65 ml/min/1 73sq m     Narrative:         National Kidney Disease Education Program recommendations are as follows:  GFR calculation is accurate only with a steady state creatinine  Chronic Kidney disease less than 60 ml/min/1 73 sq  meters  Kidney failure less than 15 ml/min/1 73 sq  meters      CBC (With Platelets) [[id number]]  (Abnormal) Collected:  12/14/18 0515    Lab Status:  Final result Specimen:  Blood from Arm, Right Updated:  12/14/18 0538     WBC 9 48 Thousand/uL      RBC 4 92 Million/uL      Hemoglobin 12 8 g/dL      Hematocrit 41 7 %      MCV 85 fL      MCH 26 0 (L) pg      MCHC 30 7 (L) g/dL      RDW 15 1 %      Platelets 769 Thousands/uL      MPV 11 9 fL     T3, free [[id number]]  (Normal) Collected:  12/13/18 0425    Lab Status:  Final result Specimen:  Blood from Arm, Left Updated:  12/13/18 1805     T3, Free 2 39 pg/mL     Procalcitonin [[id number]]  (Normal) Collected:  12/13/18 0830    Lab Status:  Final result Specimen:  Blood from Arm, Left Updated:  12/13/18 1256     Procalcitonin <0 05 ng/ml     T4, free [[id number]]  (Normal) Collected:  12/13/18 0425    Lab Status:  Final result Specimen:  Blood from Arm, Left Updated:  12/13/18 0849     Free T4 0 87 ng/dL     hCG, qualitative pregnancy [[id number]]  (Normal) Collected:  12/13/18 0425    Lab Status:  Final result Specimen:  Blood from Arm, Left Updated:  12/13/18 0645     Preg, Serum Negative    D-dimer, quantitative [[id number]]  (Abnormal) Collected:  12/13/18 0518    Lab Status:  Final result Specimen:  Blood from Arm, Left Updated:  12/13/18 0544     D-Dimer, Quant 1,312 (H) ng/ml (FEU)     TSH, 3rd generation with Free T4 reflex [[id number]]  (Abnormal) Collected:  12/13/18 0425    Lab Status:  Final result Specimen:  Blood from Arm, Left Updated:  12/13/18 0522 TSH 3RD GENERATON 7 341 (H) uIU/mL     Narrative:         Patients undergoing fluorescein dye angiography may retain small amounts of fluorescein in the body for 48-72 hours post procedure  Samples containing fluorescein can produce falsely depressed TSH values  If the patient had this procedure,a specimen should be resubmitted post fluorescein clearance  The recommended reference ranges for TSH during pregnancy are as follows:  First trimester 0 1 to 2 5 uIU/mL  Second trimester  0 2 to 3 0 uIU/mL  Third trimester 0 3 to 3 0 uIU/m      BNP [[id number]]  (Abnormal) Collected:  12/13/18 0425    Lab Status:  Final result Specimen:  Blood from Arm, Left Updated:  12/13/18 0502     NT-proBNP 624 (H) pg/mL     Troponin I [[id number]]  (Abnormal) Collected:  12/13/18 0425    Lab Status:  Final result Specimen:  Blood from Arm, Left Updated:  12/13/18 0450     Troponin I 0 05 (H) ng/mL     Comprehensive metabolic panel [[id number]]  (Abnormal) Collected:  12/13/18 0425    Lab Status:  Final result Specimen:  Blood from Arm, Left Updated:  12/13/18 0448     Sodium 139 mmol/L      Potassium 3 3 (L) mmol/L      Chloride 104 mmol/L      CO2 30 mmol/L      ANION GAP 5 mmol/L      BUN 13 mg/dL      Creatinine 0 97 mg/dL      Glucose 137 mg/dL      Calcium 8 9 mg/dL      AST 71 (H) U/L       (H) U/L      Alkaline Phosphatase 136 (H) U/L      Total Protein 8 3 (H) g/dL      Albumin 3 4 (L) g/dL      Total Bilirubin 0 30 mg/dL      eGFR 74 ml/min/1 73sq m     Narrative:         National Kidney Disease Education Program recommendations are as follows:  GFR calculation is accurate only with a steady state creatinine  Chronic Kidney disease less than 60 ml/min/1 73 sq  meters  Kidney failure less than 15 ml/min/1 73 sq  meters      CBC and differential [[id number]]  (Abnormal) Collected:  12/13/18 0425    Lab Status:  Final result Specimen:  Blood from Arm, Left Updated:  12/13/18 0432     WBC 5 56 Thousand/uL      RBC 4 97 Million/uL      Hemoglobin 12 9 g/dL      Hematocrit 40 6 %      MCV 82 fL      MCH 26 0 (L) pg      MCHC 31 8 g/dL      RDW 14 8 %      MPV 12 1 fL      Platelets 705 Thousands/uL      nRBC 0 /100 WBCs      Neutrophils Relative 51 %      Immat GRANS % 0 %      Lymphocytes Relative 40 %      Monocytes Relative 5 %      Eosinophils Relative 3 %      Basophils Relative 1 %      Neutrophils Absolute 2 86 Thousands/µL      Immature Grans Absolute 0 02 Thousand/uL      Lymphocytes Absolute 2 23 Thousands/µL      Monocytes Absolute 0 25 Thousand/µL      Eosinophils Absolute 0 16 Thousand/µL      Basophils Absolute 0 04 Thousands/µL                  CTA ED chest PE study   Final Result by Le Arellano DO (12/13 4149)   1  No CT evidence of pulmonary embolism  2   Diffuse groundglass infiltrates  Differential includes alveolar edema, alveolar pneumonia or hypersensitivity pneumonitis  Clinical correlation recommended  3   4 1 cm ascending aortic aneurysm  The overall size has slightly decreased from the earlier study  The study was marked in [facility] for immediate notification  Workstation performed: [id number]         X-ray chest 2 views   ED Interpretation by Guillermina Mcdonald MD (53/72 5566)   Appears similar to last time but previously read as PNA, unlikely I see no acute findings maybe mild vascular congestion      Final Result by Chitra Andre MD (12/13 3408)   Addendum 1 of 1 by Chitra Andre MD (12/13 3155)   ADDENDUM:      Comparison was made with chest radiograph and CT chest of 4/22/2017  Appearance of right perihilar/lower lung field airspace disease is similar    to chest radiograph of 4/22/2017      Final      Airspace disease throughout the right lower lung field, in appearance suggestive of an acute infiltrate of infectious or inflammatory etiology  However, given clinical presentation, findings may represent asymmetric pulmonary edema    Additional    differential considerations such as pulmonary infarct cannot be excluded  Follow-up with CT PA is recommended for further evaluation  Findings discussed with Dr Jett Ruiz at 5:39 AM, 12/13/18            Workstation performed: [id number]                    Procedures  Procedures       Phone Contacts  ED Phone Contact    ED Course  ED Course as of Dec 16 2128   Thu Dec 13, 2018   0501 EKG demonstrates sinus tachycardia rate of 102  There is ST depression in lead 2  There is minimal ST elevation in V2 that is nondiagnostic in similar to prior EKG in April of 2017      0617 Treated with labetalol due to symptoms associated with elevated hypertension with improvement in symptoms  Initial Sepsis Screening     Row Name 12/13/18 4705                Is the patient's history suggestive of a new or worsening infection? No  -EP        Suspected source of infection          Are two or more of the following signs & symptoms of infection both present and new to the patient? No  -EP        Indicate SIRS criteria          If the answer is yes to both questions, suspicion of sepsis is present          If severe sepsis is present AND tissue hypoperfusion perists in the hour after fluid resuscitation or lactate > 4, the patient meets criteria for SEPTIC SHOCK          Are any of the following organ dysfunction criteria present within 6 hours of suspected infection and SIRS criteria that are NOT considered to be chronic conditions?         Organ dysfunction          Date of presentation of severe sepsis          Time of presentation of severe sepsis          Tissue hypoperfusion persists in the hour after crystalloid fluid administration, evidenced, by either:          Was hypotension present within one hour of the conclusion of crystalloid fluid administration?           Date of presentation of septic shock          Time of presentation of septic shock            User Key  (r) = Recorded By, (t) = Taken By, (c) = Cosigned By    [address] Name Provider Type    EP Tori Chawla MD Physician                  [facility]  CritCare Time    Disposition  Final diagnoses:   Dyspnea   Chest pain   Elevated troponin   PND (paroxysmal nocturnal dyspnea)   Orthopnea   Abnormal CT of the chest     Time reflects when diagnosis was documented in both MDM as applicable and the Disposition within this note     Time User Action Codes Description Comment    12/13/2018  6:21 AM Romayne Aquino Add [R06 00] Dyspnea     12/13/2018  6:21 AM Romayne Aquino Add [R07 9] Chest pain     12/13/2018  6:22 AM Romayne Aquino Add [R74 8] Elevated troponin     12/13/2018  6:22 AM Romayne Aquino Add [R06 00] PND (paroxysmal nocturnal dyspnea)     12/13/2018  6:22 AM Romayne Aquino Add [R06 01] Orthopnea     12/13/2018  7:00 AM Romayne Aquino Add [R93 89] Abnormal CT of the chest     12/13/2018  3:16 PM Shawna Hernandez [J45 909] Asthma, unspecified asthma severity, unspecified whether complicated, unspecified whether persistent     12/13/2018  3:16 PM Renea, [address] [L62 744] Asthma, unspecified asthma severity, unspecified whether complicated, unspecified whether persistent     12/14/2018  9:45 AM Shawna Kendall Add [I10] Essential hypertension     12/14/2018  9:45 AM Shawna Kendall Modify [I10] Essential hypertension     12/14/2018  9:45 AM Renea, [address] Essential hypertension     12/14/2018  3:43 PM Renea, [address] [facility] [E03 9] Acquired hypothyroidism       ED Disposition     ED Disposition Condition Comment    Admit  Case was discussed with AIXA and the patient's admission status was agreed to be Admission Status: inpatient status to the service of Dr Neha Lujan           Follow-up Information     Follow up With Specialties Details Why [address], MD Internal Medicine Schedule an appointment as soon as possible for a visit in 1 week(s)   James Hardwick Steven  [phone]      Al Magdaleno MD Pulmonary Disease Schedule an appointment as soon as possible for a visit in 1 month(s) need sleep study, further investigation to chronic lung changes found on CT chest [address] [facility]      Ritika Mercer MD Rheumatology Schedule an appointment as soon as possible for a visit in 1 month(s) further evaluation of possible underlying inflammatory vs autoimmune disorder given chronic lung changes on CT chest [address] [address]  [phone]            Discharge Medication List as of 12/14/2018  5:28 PM      START taking these medications    Details   levothyroxine 75 mcg tablet Take 1 tablet (75 mcg total) by mouth daily in the early morning, Starting Sat 12/15/2018, Normal      predniSONE 20 mg tablet 40 x 2 days then 30 x 3, 20 x 3 and stop, Normal      verapamil (CALAN-SR) 180 mg CR tablet Take 1 tablet (180 mg total) by mouth daily, Starting Fri 12/14/2018, Normal         CONTINUE these medications which have CHANGED    Details   cloNIDine (CATAPRES) 0 1 mg tablet Take 1 tablet (0 1 mg total) by mouth every 12 (twelve) hours for 90 days, Starting Fri 12/14/2018, Until u 3/14/2019, Normal      hydrochlorothiazide (HYDRODIURIL) 25 mg tablet Take 1 tablet (25 mg total) by mouth daily, Starting Fri 12/14/2018, Normal      montelukast (SINGULAIR) 10 mg tablet Take 1 tablet (10 mg total) by mouth daily at bedtime, Starting Fri 12/14/2018, Normal         CONTINUE these medications which have NOT CHANGED    Details   albuterol (PROVENTIL HFA,VENTOLIN HFA) 90 mcg/act inhaler Inhale 2 puffs every 6 (six) hours as needed for wheezing, Until Discontinued, Historical Med      budesonide-formoterol (SYMBICORT) 160-4 5 mcg/act inhaler Inhale 2 puffs 2 (two) times a day, Until Discontinued, Historical Med      potassium chloride (K-DUR,KLOR-CON) 10 mEq tablet Take 10 mEq by mouth daily  , Until Discontinued, Historical Med      thiamine 100 MG tablet Take 100 mg by mouth daily, Until Discontinued, Historical Med         STOP taking these medications       irbesartan (AVAPRO) 150 mg tablet Comments:   Reason for Stopping:         losartan (COZAAR) 100 MG tablet Comments:   Reason for Stopping:         verapamil (VERELAN PM) 360 MG 24 hr capsule Comments:   Reason for Stopping:               Outpatient Discharge Orders  Ambulatory referral to Pulmonology   Standing Status: Future  Standing Exp   Date: 06/14/19     Discharge Diet     Activity as tolerated     Call provider for:  severe uncontrolled pain     Call provider for:  difficulty breathing, headache or visual disturbances     Call provider for:  persistent dizziness or light-headedness         ED Provider  Electronically Signed by           Bree Tomas MD  12/16/18 8476

## 2018-12-13 NOTE — H&P
H&P- Na Vital 1980, 45 y o  female MRN: [id number]    Unit/Bed#: ED 24 Encounter: [id number]    Primary Care Provider: Alexa Mendoza MD   Date and time admitted to hospital: 12/13/2018  3:51 AM        Hypothyroidism   Assessment & Plan    C/w synthyroid  Again, not been taking this since she ran out of her meds  HTN (hypertension)   Assessment & Plan    Patient was on ARB as well as HCTZ for HTN  Ran out 2 months ago  ? Volume overload/CHF/Asthma exacerbation  Will give lasix 40 mg IV and follow up clinically  Will get Echo  Asthma   Assessment & Plan    On singular, symbicort and ventolin  Ran out of meds 2 months ago  Since then has not been taking  Likely a component of asthma exacerbation in addition to volume overload  * Orthopnea   Assessment & Plan    And dyspnea on exertion  Worse over the last 2 weeks  Volume overload/Asthma/? PNA   Will check procal   Await formal read of CT PE study and give one dose of CAP treatment for now  VTE Prophylaxis: Heparin  / sequential compression device   Code Status: Full Code  POLST: There is no POLST form on file for this patient (pre-hospital)  Discussion with family: Discussed with patient she will update her family in some hours  Anticipated Length of Stay:  Patient will be admitted on an Emergency basis with an anticipated length of stay of  More than 2 midnights  Justification for Hospital Stay: respiratory distress    Total Time for Visit, including Counseling / Coordination of Care: 45 minutes  Greater than 50% of this total time spent on direct patient counseling and coordination of care  Chief Complaint:     Shortness of breath  History of Present Illness:    Na Vital is a 45 y o  female who presents with shortness of breath over the last 2 weeks      The patient notes decreased exercise tolerance where she can only walk a couple feet before getting shortness of breath more than 2 weeks ago she was able to walk for several blocks with no shortness of breath  In addition the patient also notes orthopedic for the last 2 weeks which is gradually been getting worse  The patient denies any leg swelling or any history of cardiac disease or CHF  She was however on hydrochlorothiazide as well as an ARB up until 2 months ago when her insurance lapsed and she has been unable to afford any of her medications  She does specifically deny any weight gain and states that she has in fact lost weight  The patient also has a background of asthma and as she lost her insurance 2 months ago she has also been out of her asthma medications including Singulair Ventolin and Symbicort for the last month or so  Specifically the patient denies any cough fevers chills sick contacts or anything suggesting an infective process  Chest x-ray is more in keeping with pneumonia than pulmonary edema however the story does sound more like volume overload  A CT PE study was done in the emergency department with a report of this is still pending at the time of admission  In addition to the above the patient has a background of hypothyroidism and was on Synthroid again only up until 2 months ago when her insurance ran out and she has not been able to purchase any of her medication       Review of Systems:    Review of Systems   Constitutional: Positive for activity change and unexpected weight change  Negative for appetite change, chills, diaphoresis, fatigue and fever  HENT: Negative  Negative for congestion  Respiratory: Positive for chest tightness, shortness of breath and wheezing  Negative for cough, choking and stridor  Cardiovascular: Negative for chest pain, palpitations and leg swelling  Gastrointestinal: Negative  Endocrine: Negative  Genitourinary: Negative  Musculoskeletal: Negative  Skin: Negative  Allergic/Immunologic: Negative  Neurological: Negative  Hematological: Negative  Past Medical and Surgical History:     Past Medical History:   Diagnosis Date    Asthma     Disease of thyroid gland     Hypertension        Past Surgical History:   Procedure Laterality Date    CARPAL TUNNEL RELEASE       SECTION      4 c section    HYSTERECTOMY      LAPAROSCOPIC GASTRIC BANDING      VOCAL CORD LATERALIZATION, ENDOSCOPIC APPROACH W/ MLB         Meds/Allergies:    Prior to Admission medications    Medication Sig Start Date End Date Taking? Authorizing Provider   albuterol (PROVENTIL HFA,VENTOLIN HFA) 90 mcg/act inhaler Inhale 2 puffs every 6 (six) hours as needed for wheezing    Historical Provider, MD   budesonide-formoterol (SYMBICORT) 160-4 5 mcg/act inhaler Inhale 2 puffs 2 (two) times a day    Historical Provider, MD   cloNIDine (CATAPRES) 0 1 mg tablet Take 1 tablet by mouth 2 (two) times a day for 30 days 4/26/17 10/25/17  Cr Lua,    hydrochlorothiazide (HYDRODIURIL) 25 mg tablet Take 25 mg by mouth every other day      Historical Provider, MD   irbesartan (AVAPRO) 150 mg tablet Take 150 mg by mouth daily at bedtime    Historical Provider, MD   losartan (COZAAR) 100 MG tablet Take 100 mg by mouth daily    Historical Provider, MD   montelukast (SINGULAIR) 10 mg tablet Take 10 mg by mouth daily at bedtime    Historical Provider, MD   potassium chloride (K-DUR,KLOR-CON) 10 mEq tablet Take 10 mEq by mouth daily      Historical Provider, MD   thiamine 100 MG tablet Take 100 mg by mouth daily    Historical Provider, MD   verapamil (VERELAN PM) 360 MG 24 hr capsule Take 360 mg by mouth daily      Historical Provider, MD     I have reviewed home medications with patient personally  Allergies: Allergies   Allergen Reactions    Oxycodone Anaphylaxis    Lisinopril        Social History:     Marital Status: /Civil Union   Occupation:   Patient Pre-hospital Living Situation: lives with      Patient Pre-hospital Level of Mobility: fully  Patient Pre-hospital Diet Restrictions: none  Substance Use History:   History   Alcohol Use No     History   Smoking Status    Never Smoker   Smokeless Tobacco    Never Used     History   Drug Use No       Family History:    Family History   Problem Relation Age of Onset    Family history unknown: Yes       Physical Exam:     Vitals:   Blood Pressure: 155/90 (12/13/18 0521)  Pulse: 104 (12/13/18 0521)  Temperature: 98 3 °F (36 8 °C) (12/13/18 0402)  Temp Source: Oral (12/13/18 0402)  Respirations: 18 (12/13/18 0521)  Weight - Scale: 112 kg (245 lb 13 oz) (12/13/18 0356)  SpO2: 95 % (12/13/18 0521)    Physical Exam   Constitutional: She is oriented to person, place, and time  No distress  Obese  HENT:   Head: Normocephalic and atraumatic  Mouth/Throat: No oropharyngeal exudate  Eyes: Right eye exhibits no discharge  No scleral icterus  Neck: No JVD present  No tracheal deviation present  No thyromegaly present  Cardiovascular: Normal rate  Exam reveals no gallop and no friction rub  No murmur heard  Pulmonary/Chest: Breath sounds normal  She is in respiratory distress  She has no wheezes  She has no rales  She exhibits no tenderness  Abdominal: Soft  She exhibits no distension and no mass  There is no tenderness  There is no rebound and no guarding  Musculoskeletal: Normal range of motion  She exhibits no edema, tenderness or deformity  Lymphadenopathy:     She has no cervical adenopathy  Neurological: She is alert and oriented to person, place, and time  She displays normal reflexes  No cranial nerve deficit  She exhibits abnormal muscle tone  Coordination normal    Skin: Skin is warm and dry  No rash noted  She is not diaphoretic  No erythema  No pallor  Psychiatric: She has a normal mood and affect  Additional Data:     Lab Results: I have personally reviewed pertinent reports          Results from last 7 days  Lab Units 12/13/18  3525   WBC Thousand/uL 5 56   HEMOGLOBIN g/dL 12 9   HEMATOCRIT % 40 6   PLATELETS Thousands/uL 258   NEUTROS PCT % 51   LYMPHS PCT % 40   MONOS PCT % 5   EOS PCT % 3       Results from last 7 days  Lab Units 12/13/18  0425   SODIUM mmol/L 139   POTASSIUM mmol/L 3 3*   CHLORIDE mmol/L 104   CO2 mmol/L 30   BUN mg/dL 13   CREATININE mg/dL 0 97   ANION GAP mmol/L 5   CALCIUM mg/dL 8 9   ALBUMIN g/dL 3 4*   TOTAL BILIRUBIN mg/dL 0 30   ALK PHOS U/L 136*   ALT U/L 118*   AST U/L 71*   GLUCOSE RANDOM mg/dL 137                       Imaging: I have personally reviewed pertinent reports  X-ray chest 2 views   ED Interpretation by Javad Fitch MD (11/86 0791)   Appears similar to last time but previously read as PNA, unlikely I see no acute findings maybe mild vascular congestion      Final Result by Ranjana Castillo MD (12/13 7770)   Addendum 1 of 1 by Ranjana Castillo MD (12/13 0469)   ADDENDUM:      Comparison was made with chest radiograph and CT chest of 4/22/2017  Appearance of right perihilar/lower lung field airspace disease is similar    to chest radiograph of 4/22/2017      Final      Airspace disease throughout the right lower lung field, in appearance suggestive of an acute infiltrate of infectious or inflammatory etiology  However, given clinical presentation, findings may represent asymmetric pulmonary edema  Additional    differential considerations such as pulmonary infarct cannot be excluded  Follow-up with CT PA is recommended for further evaluation  Findings discussed with Dr Arnie Beck at 5:39 AM, 12/13/18            Workstation performed: [id number]         CTA ED chest PE study    (Results Pending)       EKG, Pathology, and Other Studies Reviewed on Admission:   · EKG: no EKG on chart  Will request      Allscripts / Epic Records Reviewed: Yes     ** Please Note: This note has been constructed using a voice recognition system   **

## 2018-12-13 NOTE — ASSESSMENT & PLAN NOTE
On singular, symbicort and ventolin  Ran out of meds 2 months ago  Since then has not been taking  Likely a component of asthma exacerbation in addition to volume overload

## 2018-12-13 NOTE — ASSESSMENT & PLAN NOTE
And dyspnea on exertion  Worse over the last 2 weeks  Volume overload/Asthma/? PNA   Will check procal   Await formal read of CT PE study and give one dose of CAP treatment for now

## 2018-12-13 NOTE — CONSULTS
Consultation - Pulmonary Medicine   Carlos Mckee 45 y o  female MRN: [id number]  Unit/Bed#: -01 Encounter: [id number]      Assessment:  Shortness of breath  Asthma mild intermittent by history with mild acute exacerbation  Abnormal CT of the chest    Plan:   Shortness of breath unclear etiology, D-dimer was elevated when she came in, CT angiogram negative for PE  Discussed CT of the chest with bilateral ground-glass infiltrates, seems to be slightly increased from the prior CT of the chest in April 2017  Paratracheal mediastinal and subcarinal lymph nodes mildly prominent  Asthma mild intermittent by history, with mild acute exacerbation   Continue with levalbuterol and ipratropium  Currently on Solu-Medrol 40 mg q 8 can be decreased to 40 mg q 12h today and decreased to 40 mg daily in a m     Will add Flovent to1 10 mcg 2 puff twice daily  Peak flow monitor and airway clearance measures with flutter valve  White cell count is normal, procalcitonin is normal, do not think the bilateral ground-glass opacities infectious  Can be monitored off antibiotics  Likely inflammatory etiology, as a cause for the bilateral ground-glass opacities,/ ?  Interstitial lung disease  Would need serology off steroids, and also hypersensitivity panel off  steroids  Follow up with echocardiogram,  Outpatient Pulmonary follow-up for PFTs, repeat imaging for resolution of those opacities in 4-6 weeks  Also for serology off steroids    Thank you for the consultation will continue to follow  Discussed with Audrey Samuel from [facility]      History of Present Illness   Physician Requesting Consult: Fredrick Omer MD  Reason for Consult / Principal Problem:  Abnormal CT of the chest  Hx and PE limited by:  None  HPI: Carlos Mckee is a 45y o  year old female who has never smoked, with history of mild intermittent asthma, follows up with pulmonology at [facility], has been albuterol MDI as needed only and she states has not been using it for quite some time, never admitted into the hospital for asthma flare-up in the past   Prior admission into [facility] in April 2017, with shortness of breath found to have abnormal CT of the chest with bilateral ground-glass opacities, likely thought to be inflammatory then, blood work done at that time, demonstrating screening IZZY has been positive, unclear if she followed up rheumatology  Also patient did not have any repeat CT of the chest done since April 2017 for resolution of those opacities  She has history of hypertension hypothyroidism, and has been on medications, and she has stopped taking all the medications for the past 3 months because of insurance issues she states  She states until last week she has been doing okay doing her daily activities and chores at home, no cough, no shortness of breath on exertion, but for the past week had shortness of breath especially when lying down flat  At night, that woke her up from sleep that got worse, that made her come into the ER  Pulmonary has been consulted for the abnormal CT of the chest and shortness of Breath  Inpatient consult to Pulmonology  Consult performed by: Sugey Hay ordered by: Ozzy Soriano          Review of Systems   Constitutional: Positive for fatigue  Negative for appetite change, chills, diaphoresis, fever and unexpected weight change  HENT: Negative for congestion, ear discharge, ear pain, nosebleeds, postnasal drip, rhinorrhea, sinus pain, sore throat and voice change  Eyes: Negative for pain, discharge and visual disturbance  Respiratory: Positive for chest tightness and shortness of breath  Negative for apnea, cough, choking, wheezing and stridor  Cardiovascular: Negative for chest pain, palpitations and leg swelling  Gastrointestinal: Negative for abdominal pain, blood in stool, constipation, diarrhea and vomiting     Endocrine: Negative for cold intolerance, heat intolerance, polydipsia, polyphagia and polyuria  Genitourinary: Negative for difficulty urinating and dysuria  Musculoskeletal: Negative for arthralgias and neck pain  Skin: Negative for pallor and rash  Allergic/Immunologic: Negative for environmental allergies and food allergies  Neurological: Negative for dizziness, speech difficulty, weakness and light-headedness  Hematological: Negative for adenopathy  Does not bruise/bleed easily  Psychiatric/Behavioral: Negative for agitation, confusion and sleep disturbance  The patient is not nervous/anxious  Historical Information   Past Medical History:   Diagnosis Date    Asthma     Disease of thyroid gland     Hypertension      Past Surgical History:   Procedure Laterality Date    CARPAL TUNNEL RELEASE       SECTION      4 c section    HYSTERECTOMY      LAPAROSCOPIC GASTRIC BANDING      VOCAL CORD LATERALIZATION, ENDOSCOPIC APPROACH W/ MLB       Social History   History   Alcohol Use No     History   Drug Use No     History   Smoking Status    Never Smoker   Smokeless Tobacco    Never Used     Occupational History:  Works as a medical assistant at [employer]  Family History: non-contributory    Meds/Allergies   all current active meds have been reviewed    Allergies   Allergen Reactions    Oxycodone Anaphylaxis    Lisinopril        Objective   Vitals: Blood pressure 149/100, pulse (!) 106, temperature 99 °F (37 2 °C), temperature source Oral, resp  rate 20, weight 112 kg (245 lb 13 oz), SpO2 97 %  ,Body mass index is 43 54 kg/m²  Intake/Output Summary (Last 24 hours) at 18 1608  Last data filed at 18 1411   Gross per 24 hour   Intake              360 ml   Output                0 ml   Net              360 ml     Invasive Devices     Peripheral Intravenous Line            Peripheral IV 18 Left Antecubital less than 1 day                Physical Exam   Constitutional: She is oriented to person, place, and time   She appears well-developed and well-nourished  HENT:   Head: Normocephalic and atraumatic  Eyes: Pupils are equal, round, and reactive to light  Conjunctivae are normal    Neck: Normal range of motion  Neck supple  No JVD present  No thyromegaly present  Cardiovascular: Normal rate, regular rhythm and normal heart sounds  Exam reveals no gallop and no friction rub  No murmur heard  Pulmonary/Chest: Effort normal and breath sounds normal  No respiratory distress  She has no wheezes  She has no rales  She exhibits no tenderness  Abdominal: Soft  Bowel sounds are normal    Musculoskeletal: Normal range of motion  She exhibits no edema, tenderness or deformity  Lymphadenopathy:     She has no cervical adenopathy  Neurological: She is alert and oriented to person, place, and time  Skin: Skin is warm and dry  Psychiatric: She has a normal mood and affect  Nursing note and vitals reviewed  Lab Results: I have personally reviewed pertinent lab results  Imaging Studies: I have personally reviewed pertinent films in PACS  EKG, Pathology, and Other Studies: I have personally reviewed pertinent reports      VTE Prophylaxis: Sequential compression device Ania Gill)     Code Status: Level 1 - Full Code  Advance Directive and Living Will:      Power of :    POLST:

## 2018-12-13 NOTE — QUICK NOTE
Post admission follow-up:  Patient admitted early this morning for orthopnea  Has an abnormal appearing CT, had a similar clinical picture in April of 2017  Continue with treatment for possible fluid issues, she is clear pillow orthopnea  Continue to monitor labs closely, strict I/O, daily weights, supplemental O2 as needed  Appreciate pulmonology consultation  She needs outpatient workup  She has an abnormal IZZY in the past, however was reportedly told she did not have any definitive autoimmune/inflammatory disorder (daughter has lupus) and has not followed up since  Will continue to follow

## 2018-12-14 VITALS
BODY MASS INDEX: 43.54 KG/M2 | WEIGHT: 245.81 LBS | TEMPERATURE: 97.7 F | RESPIRATION RATE: 18 BRPM | DIASTOLIC BLOOD PRESSURE: 94 MMHG | HEART RATE: 100 BPM | OXYGEN SATURATION: 97 % | SYSTOLIC BLOOD PRESSURE: 141 MMHG

## 2018-12-14 LAB
ALBUMIN SERPL BCP-MCNC: 3.3 G/DL (ref 3.5–5)
ALP SERPL-CCNC: 119 U/L (ref 46–116)
ALT SERPL W P-5'-P-CCNC: 106 U/L (ref 12–78)
ANION GAP SERPL CALCULATED.3IONS-SCNC: 10 MMOL/L (ref 4–13)
AST SERPL W P-5'-P-CCNC: 49 U/L (ref 5–45)
ATRIAL RATE: 102 BPM
BILIRUB SERPL-MCNC: 0.4 MG/DL (ref 0.2–1)
BUN SERPL-MCNC: 18 MG/DL (ref 5–25)
CALCIUM SERPL-MCNC: 9.7 MG/DL (ref 8.3–10.1)
CHLORIDE SERPL-SCNC: 102 MMOL/L (ref 100–108)
CO2 SERPL-SCNC: 25 MMOL/L (ref 21–32)
CREAT SERPL-MCNC: 1.08 MG/DL (ref 0.6–1.3)
ERYTHROCYTE [DISTWIDTH] IN BLOOD BY AUTOMATED COUNT: 15.1 % (ref 11.6–15.1)
GFR SERPL CREATININE-BSD FRML MDRD: 65 ML/MIN/1.73SQ M
GLUCOSE SERPL-MCNC: 161 MG/DL (ref 65–140)
HCT VFR BLD AUTO: 41.7 % (ref 34.8–46.1)
HGB BLD-MCNC: 12.8 G/DL (ref 11.5–15.4)
MCH RBC QN AUTO: 26 PG (ref 26.8–34.3)
MCHC RBC AUTO-ENTMCNC: 30.7 G/DL (ref 31.4–37.4)
MCV RBC AUTO: 85 FL (ref 82–98)
P AXIS: 72 DEGREES
PLATELET # BLD AUTO: 282 THOUSANDS/UL (ref 149–390)
PMV BLD AUTO: 11.9 FL (ref 8.9–12.7)
POTASSIUM SERPL-SCNC: 4 MMOL/L (ref 3.5–5.3)
PR INTERVAL: 140 MS
PROT SERPL-MCNC: 8.2 G/DL (ref 6.4–8.2)
QRS AXIS: 32 DEGREES
QRSD INTERVAL: 78 MS
QT INTERVAL: 372 MS
QTC INTERVAL: 484 MS
RBC # BLD AUTO: 4.92 MILLION/UL (ref 3.81–5.12)
SODIUM SERPL-SCNC: 137 MMOL/L (ref 136–145)
T WAVE AXIS: 190 DEGREES
VENTRICULAR RATE: 102 BPM
WBC # BLD AUTO: 9.48 THOUSAND/UL (ref 4.31–10.16)

## 2018-12-14 PROCEDURE — 99238 HOSP IP/OBS DSCHRG MGMT 30/<: CPT | Performed by: PHYSICIAN ASSISTANT

## 2018-12-14 PROCEDURE — 99254 IP/OBS CNSLTJ NEW/EST MOD 60: CPT | Performed by: PHYSICIAN ASSISTANT

## 2018-12-14 PROCEDURE — 93005 ELECTROCARDIOGRAM TRACING: CPT

## 2018-12-14 PROCEDURE — 99232 SBSQ HOSP IP/OBS MODERATE 35: CPT | Performed by: INTERNAL MEDICINE

## 2018-12-14 PROCEDURE — 93010 ELECTROCARDIOGRAM REPORT: CPT | Performed by: INTERNAL MEDICINE

## 2018-12-14 PROCEDURE — 80053 COMPREHEN METABOLIC PANEL: CPT | Performed by: INTERNAL MEDICINE

## 2018-12-14 PROCEDURE — 85027 COMPLETE CBC AUTOMATED: CPT | Performed by: INTERNAL MEDICINE

## 2018-12-14 RX ORDER — HYDROCHLOROTHIAZIDE 25 MG/1
25 TABLET ORAL DAILY
Qty: 90 TABLET | Refills: 1 | Status: SHIPPED | OUTPATIENT
Start: 2018-12-14

## 2018-12-14 RX ORDER — CLONIDINE HYDROCHLORIDE 0.1 MG/1
0.1 TABLET ORAL EVERY 12 HOURS SCHEDULED
Qty: 180 TABLET | Refills: 1 | Status: SHIPPED | OUTPATIENT
Start: 2018-12-14 | End: 2019-03-14

## 2018-12-14 RX ORDER — METHYLPREDNISOLONE SODIUM SUCCINATE 40 MG/ML
40 INJECTION, POWDER, LYOPHILIZED, FOR SOLUTION INTRAMUSCULAR; INTRAVENOUS EVERY 12 HOURS SCHEDULED
Status: DISCONTINUED | OUTPATIENT
Start: 2018-12-14 | End: 2018-12-14

## 2018-12-14 RX ORDER — PREDNISONE 20 MG/1
40 TABLET ORAL DAILY
Status: DISCONTINUED | OUTPATIENT
Start: 2018-12-15 | End: 2018-12-14 | Stop reason: HOSPADM

## 2018-12-14 RX ORDER — PREDNISONE 20 MG/1
TABLET ORAL
Qty: 12 TABLET | Refills: 0 | Status: SHIPPED | OUTPATIENT
Start: 2018-12-14

## 2018-12-14 RX ORDER — ACETAMINOPHEN 325 MG/1
650 TABLET ORAL EVERY 6 HOURS PRN
Status: DISCONTINUED | OUTPATIENT
Start: 2018-12-14 | End: 2018-12-14 | Stop reason: HOSPADM

## 2018-12-14 RX ORDER — MONTELUKAST SODIUM 10 MG/1
10 TABLET ORAL
Qty: 90 TABLET | Refills: 1 | Status: SHIPPED | OUTPATIENT
Start: 2018-12-14

## 2018-12-14 RX ORDER — FUROSEMIDE 10 MG/ML
40 INJECTION INTRAMUSCULAR; INTRAVENOUS DAILY
Status: DISCONTINUED | OUTPATIENT
Start: 2018-12-14 | End: 2018-12-14

## 2018-12-14 RX ORDER — LEVOTHYROXINE SODIUM 0.07 MG/1
75 TABLET ORAL
Qty: 90 TABLET | Refills: 1 | Status: SHIPPED | OUTPATIENT
Start: 2018-12-15

## 2018-12-14 RX ADMIN — LEVOTHYROXINE SODIUM 75 MCG: 75 TABLET ORAL at 05:41

## 2018-12-14 RX ADMIN — HEPARIN SODIUM 5000 UNITS: 5000 INJECTION, SOLUTION INTRAVENOUS; SUBCUTANEOUS at 05:40

## 2018-12-14 RX ADMIN — VERAPAMIL HYDROCHLORIDE 120 MG: 120 TABLET, FILM COATED, EXTENDED RELEASE ORAL at 10:03

## 2018-12-14 RX ADMIN — HEPARIN SODIUM 5000 UNITS: 5000 INJECTION, SOLUTION INTRAVENOUS; SUBCUTANEOUS at 13:26

## 2018-12-14 RX ADMIN — THIAMINE HCL TAB 100 MG 100 MG: 100 TAB at 10:04

## 2018-12-14 RX ADMIN — BUDESONIDE AND FORMOTEROL FUMARATE DIHYDRATE 2 PUFF: 160; 4.5 AEROSOL RESPIRATORY (INHALATION) at 10:05

## 2018-12-14 RX ADMIN — ACETAMINOPHEN 650 MG: 325 TABLET, FILM COATED ORAL at 11:03

## 2018-12-14 RX ADMIN — METHYLPREDNISOLONE SODIUM SUCCINATE 40 MG: 40 INJECTION, POWDER, FOR SOLUTION INTRAMUSCULAR; INTRAVENOUS at 05:40

## 2018-12-14 RX ADMIN — CLONIDINE HYDROCHLORIDE 0.1 MG: 0.1 TABLET ORAL at 10:04

## 2018-12-14 RX ADMIN — MONTELUKAST SODIUM 10 MG: 10 TABLET, FILM COATED ORAL at 10:04

## 2018-12-14 NOTE — ASSESSMENT & PLAN NOTE
Orthopneic and dyspneic on presentation - suspect related to missed medications and mild asthma exacerbation  CTA no PE, chronic appearing changes, per pulmonology  Continue IV lasix, was on lasix every other day previously

## 2018-12-14 NOTE — DISCHARGE SUMMARY
Discharge- Anselmo Araujo 1980, 45 y o  female MRN: [id number]    Unit/Bed#: -01 Encounter: [id number]    Primary Care Provider: Precious Gaspar MD   Date and time admitted to hospital: 12/13/2018  3:51 AM    * [address] and dyspneic on presentation - suspect related to missed medications and mild asthma exacerbation  CTA no PE, chronic appearing changes, per pulmonology  Pulmonology evaluated patient - no acute changes at this time, short steroid taper on discharge  Cardiology evaluated patient - no acute changes at this time, recommend adherence to medications, and sleep study outpatient    Cleared for discharge     HTN (hypertension)   Assessment & Plan    Blood pressure is significantly improved with resumption of medications; medications cross checked with [employer] $4 plan - continue clonidine 0 1 mg b i d , hydrochlorothiazide 25 mg daily, verapamil 180 mg ER daily  Echo shows pEF 60% and no RWMA or valvular issues  Hypothyroidism   Assessment & Plan    TSH elevated, T4/T3 low normal - continue previous dosing 75 mcg daily and recheck TFTs in 6 weeks  TSH 7 341, T4 low normal 0 87, T3 low normal 2 39     Asthma   Assessment & Plan    On singular, symbicort - having difficulty obtaining medications due to cost  CT chest abnormal, chronic  Needs outpatient serology and workup, follow up with pulmonology and Rheumatology  Short steroid taper, no antibiotics on discharge       Discharging Physician / Practitioner: Sunil Pack PA-C  PCP: Precious Gaspar MD  Admission Date:   Admission Orders     Ordered        12/13/18 0650  Inpatient Admission  Once             Discharge Date: 12/14/18    Resolved Problems  Date Reviewed: 12/14/2018    None          Consultations During Hospital Stay:  · Cardiology  · Pulmonology     Procedures Performed:     CTA ED chest PE study   Final Result by Remigio Novak DO (12/13 [id number])   1    No CT evidence of pulmonary embolism  2   Diffuse groundglass infiltrates  Differential includes alveolar edema, alveolar pneumonia or hypersensitivity pneumonitis  Clinical correlation recommended  3   4 1 cm ascending aortic aneurysm  The overall size has slightly decreased from the earlier study  The study was marked in [facility] for immediate notification  Workstation performed: [id number]         X-ray chest 2 views   ED Interpretation by Memo Simmons MD (72/91 1459)   Appears similar to last time but previously read as PNA, unlikely I see no acute findings maybe mild vascular congestion      Final Result by Miri Mcmahan MD (12/13 6372)   Addendum 1 of 1 by Miri Mcmahan MD (12/13 7967)   ADDENDUM:      Comparison was made with chest radiograph and CT chest of 4/22/2017  Appearance of right perihilar/lower lung field airspace disease is similar    to chest radiograph of 4/22/2017      Final      Airspace disease throughout the right lower lung field, in appearance suggestive of an acute infiltrate of infectious or inflammatory etiology  However, given clinical presentation, findings may represent asymmetric pulmonary edema  Additional    differential considerations such as pulmonary infarct cannot be excluded  Follow-up with CT PA is recommended for further evaluation  Findings discussed with Dr Rita Lundborg at 5:39 AM, 12/13/18            Workstation performed: [id number]           ECHO: Ejection fraction was estimated to be 65 %  There were no regional wall motion abnormalities        Significant Findings / Test Results:     · Abnormal TFTs  · Elevated D-dimer  · Elevated BNP  · Mild elevated troponin 0 05    Incidental Findings:   · CT findings as above     Test Results Pending at Discharge (will require follow up):   ·      Outpatient Tests Requested:  · Pulmonology follow-up - further evaluation for possible interstitial lung disease  · Rheumatology follow-up - further evaluation for possible autoimmune/inflammatory disorder  · Obstructive sleep apnea testing, PFTs  · TFTs in 6 weeks    Complications:  None    Reason for Admission:  [facility] [place] Course:     Izabel Anderson is a 45 y o  female patient who originally presented to the hospital on 12/13/2018 due to orthopnea  Patient has been having difficulty obtaining her medications, has been off her blood pressure medications for about 2 months and not taking her inhalers routinely  Patient was found to be hypertensive > 060 systolic with mild troponin elevation at 0 05 and was brought in for further evaluation  D-dimer found to also be elevated, CTA showing no evidence of PE  There was diffuse ground-glass opacities which appear to have been present since last year  She had been lost to follow-up in terms of getting repeat CT  Currently undergoing workup, her daughter has lupus however apparently her IZZY is normal     Blood pressure is significantly improved on home regimen, respiratory status improved  She still gets orthopnea clean down, however this is felt to be related to possible sleep apnea  She will need outpatient follow-up as above  She is cleared for discharge with steroid taper  No antibiotics at discharge  Personally reviewed her medications at discharge, she will probably not be able to obtain the singular the Symbicort, however she has samples of Symbicort at home  All other medications are affordable with the $4 [employer] plan  Please see above list of diagnoses and related plan for additional information  Condition at Discharge: stable     Discharge Day Visit / Exam:     * Please refer to separate progress note for these details *    Discussion with Family:  Patient    Discharge instructions/Information to patient and family:   See after visit summary for information provided to patient and family        Provisions for Follow-Up Care:  See after visit summary for information related to follow-up care and any pertinent home health orders  Disposition:     Home      Planned Readmission:  None     Discharge Statement:  I spent 20 minutes discharging the patient  This time was spent on the day of discharge  I had direct contact with the patient on the day of discharge  Greater than 50% of the total time was spent examining patient, answering all patient questions, arranging and discussing plan of care with patient as well as directly providing post-discharge instructions  Additional time then spent on discharge activities  Discharge Medications:  See after visit summary for reconciled discharge medications provided to patient and family        ** Please Note: This note has been constructed using a voice recognition system **

## 2018-12-14 NOTE — PLAN OF CARE
Problem: DISCHARGE PLANNING - CARE MANAGEMENT  Goal: Discharge to post-acute care or home with appropriate resources  INTERVENTIONS:  - Conduct assessment to determine patient/family and health care team treatment goals, and need for post-acute services based on payer coverage, community resources, and patient preferences, and barriers to discharge  - Address psychosocial, clinical, and financial barriers to discharge as identified in assessment in conjunction with the patient/family and health care team  - Arrange appropriate level of post-acute services according to patient's   needs and preference and payer coverage in collaboration with the physician and health care team  - Communicate with and update the patient/family, physician, and health care team regarding progress on the discharge plan  - Arrange appropriate transportation to post-acute venues  Outcome: Progressing  LOS 1  LACE 51  No 30 readmit  CM met with patient at bedside  Patient states he lives with her  and four children in their two story house  There are seven steps to enter the house from outside  Patient does not use DME  Patient is independent with ADLs  Patient does not have rehab and hhc hx  Patient fill her prescriptions with [facility] but will now fill prescriptions with [facility] for the four dollars program   Patient has hx of depression  Patient does not have substance abuse hx  Patient does not have an advance directive  Patient was given information on AD  Patient states her dtrs-Mializ and Misotis are her health representative  Patient is employed  Patient drives  Patient wants to transfer to [facility] doctor  Patient was provided an [employer] card  Patient is cleared for discharge  Patient will be transported home via   CM will follow patient's needs  Nurse and SLIM notified

## 2018-12-14 NOTE — PROGRESS NOTES
Progress Note - Pulmonary   Thony Sanchez 45 y o  female MRN: [id number]  Unit/Bed#: -01 Encounter: [id number]    Assessment:  1  Shortness of breath  2  Mild intermittent asthma with acute exacerbation  3  Abnormal chest CT    Plan:  Unclear etiology of the shortness of breath, D-Dimer was elevated, CTA negative for PE    CT chest shows bilateral ground glass infiltrates, increased from April 2017  She had serology done at that time with positive IZZY  She was evaluated by rheumatology outpatient, states she was told workup is negative for autoimmune disorders though we do not have access to those records  Possibly ILD, would need repeat imaging as outpatient as well as hypersensitivity panel off steroids  Continue Levalbuterol and ipratropium as well as symbicort - she feels symbicort makes a significant difference   Will switch to prednisone for tomorrow, can decrease by 10 mg every 3 days  She will follow up with Dr Adela Kwon at [facility] for repeat imaging and further workup of these ground glass opacities  She is stable for discharge home from pulmonary standpoint  Subjective:   Patient resting in bed, appears comfortable  Currently without any shortness of breath  Does still feel short of breath when she lies flat  Objective:     Vitals: Blood pressure 132/73, pulse 98, temperature 97 6 °F (36 4 °C), temperature source Oral, resp  rate 18, weight 112 kg (245 lb 13 oz), SpO2 94 %  ,Body mass index is 43 54 kg/m²        Intake/Output Summary (Last 24 hours) at 12/14/18 1134  Last data filed at 12/14/18 1101   Gross per 24 hour   Intake              360 ml   Output             3100 ml   Net            -2740 ml       Invasive Devices     Peripheral Intravenous Line            Peripheral IV 12/13/18 Left Antecubital 1 day                Physical Exam: /73 (BP Location: Right arm)   Pulse 98   Temp 97 6 °F (36 4 °C) (Oral)   Resp 18   Wt 112 kg (245 lb 13 oz)   SpO2 94%   BMI 43 54 kg/m²   General appearance: alert and oriented, in no acute distress  Eyes: PERRL  Lungs: clear to auscultation bilaterally  Heart: regular rate and rhythm and S1, S2 normal  Abdomen: normal findings: soft, non-tender  Extremities: No edema  Skin: Warm and dry  Neurologic: Mental status: Alert, oriented, thought content appropriate     Labs: I have personally reviewed pertinent lab results  , CBC:   Lab Results   Component Value Date    WBC 9 48 12/14/2018    HGB 12 8 12/14/2018    HCT 41 7 12/14/2018    MCV 85 12/14/2018     12/14/2018    MCH 26 0 (L) 12/14/2018    MCHC 30 7 (L) 12/14/2018    RDW 15 1 12/14/2018    MPV 11 9 12/14/2018   , CMP:   Lab Results   Component Value Date    SODIUM 137 12/14/2018    K 4 0 12/14/2018     12/14/2018    CO2 25 12/14/2018    BUN 18 12/14/2018    CREATININE 1 08 12/14/2018    CALCIUM 9 7 12/14/2018    AST 49 (H) 12/14/2018     (H) 12/14/2018    ALKPHOS 119 (H) 12/14/2018    EGFR 65 12/14/2018     Imaging and other studies: I have personally reviewed pertinent reports     and I have personally reviewed pertinent films in PACS

## 2018-12-14 NOTE — ASSESSMENT & PLAN NOTE
On singular, symbicort and ventolin, but not taking for 2 months  CT chest abnormal, chronic  Needs outpatient serology and workup, follow up with pulmonology    Afebrile, no leukocytosis, normal procalcitonin - agree with monitoring OFF abx, per pulmonology

## 2018-12-14 NOTE — PROGRESS NOTES
Patient called to say she began feeling mid-chest pressure after lying the head of her bed down to try to sleep  At this time oxygen 96% on room air and   Pt states this is the same symptom that made her feel short of breath at home and brought her into the hospital  When head of bed is lifted back up patient's symptoms are relieved   On call SLIM doctor notified

## 2018-12-14 NOTE — DISCHARGE INSTRUCTIONS
Repeat thyroid function studies (blood work) in 6 weeks    Prednisone (By mouth)   Prednisone (PRED-ni-sone)  Treats many diseases and conditions, especially problems related to inflammation  This medicine is a corticosteroid  Brand Name(s): Contrast Allergy PreMed Pack, Ayana, predniSONE Intensol   There may be other brand names for this medicine  When This Medicine Should Not Be Used: This medicine is not right for everyone  Do not use if you had an allergic reaction to prednisone or if you are pregnant  How to Use This Medicine:   Liquid, Tablet, Delayed Release Tablet  · Take your medicine as directed  Your dose may need to be changed several times to find what works best for you  · It is best to take this medicine with food or milk  · Swallow the delayed-release tablet whole  Do not crush, break, or chew it  · Measure the oral liquid medicine with a marked measuring spoon, oral syringe, or medicine cup  · Missed dose: Take a dose as soon as you remember  If it is almost time for your next dose, wait until then and take a regular dose  Do not take extra medicine to make up for a missed dose  · Store the medicine in a closed container at room temperature, away from heat, moisture, and direct light  Do not freeze the oral liquid  Drugs and Foods to Avoid:   Ask your doctor or pharmacist before using any other medicine, including over-the-counter medicines, vitamins, and herbal products    · Tell your doctor if you use any of the following:  ¨ Aminoglutethimide, amphotericin B, carbamazepine, cholestyramine, cyclosporine, digoxin, isoniazid, ketoconazole, phenobarbital, phenytoin, or rifampin  ¨ Blood thinner, such as warfarin  ¨ NSAID pain or arthritis medicine, such as aspirin, diclofenac, ibuprofen, naproxen, celecoxib  ¨ Diuretic (water pill)  ¨ Diabetes medicine  ¨ Macrolide antibiotic, such as azithromycin, clarithromycin, erythromycin  ¨ Estrogen, including birth control pills or hormone replacement therapy  · This medicine may interfere with vaccines  Ask your doctor before you get a flu shot or any other vaccines  Warnings While Using This Medicine:   · It is not safe to take this medicine during pregnancy  It could harm an unborn baby  Tell your doctor right away if you become pregnant  · Tell your doctor if you are breastfeeding or if you have kidney problems, heart failure, high blood pressure, a recent heart attack, diabetes, glaucoma, osteoporosis, or thyroid problems  Tell your doctor about any infection you have  Also tell your doctor if you have had mental or emotional problems (such as depression) or stomach or bowel problems (such as an ulcer or diverticulitis)  · This medicine may cause the following problems:  ¨ Mood or behavior changes  ¨ Higher blood pressure, retaining water, changes in salt or potassium levels in your body  ¨ Cataracts or glaucoma (with long-term use)  ¨ Weak bones or osteoporosis (with long-term use)  ¨ Slow growth in children (with long-term use)  ¨ Muscle problems (with high doses, especially if you have myasthenia gravis or similar nerve and muscle problems)  · Do not stop using this medicine suddenly  Your doctor will need to slowly decrease your dose before you stop it completely  · This medicine could cause you to get infections more easily  Tell your doctor right away if you are exposed to chicken pox, measles, or other serious infection  Tell your doctor if you had a serious infection in the past, such as tuberculosis or herpes  · Tell your doctor about any extra stress or anxiety in your life  Your dose might need to be changed for a short time  · Tell any doctor or dentist who treats you that you are using this medicine  This medicine may affect certain medical test results  · Keep all medicine out of the reach of children  Never share your medicine with anyone    Possible Side Effects While Using This Medicine:   Call your doctor right away if you notice any of these side effects:  · Allergic reaction: Itching or hives, swelling in your face or hands, swelling or tingling in your mouth or throat, chest tightness, trouble breathing  · Dark freckles, skin color changes, coldness, weakness, tiredness, nausea, vomiting, weight loss  · Depression, unusual thoughts, feelings, or behaviors, trouble sleeping  · Fever, chills, cough, sore throat, and body aches  · Muscle pain or weakness  · Rapid weight gain, swelling in your hands, ankles, or feet  · Severe stomach pain, nausea, vomiting, or red or black stools  · Skin changes or growths  · Trouble seeing, eye pain, headache  If you notice these less serious side effects, talk with your doctor:   · Increased appetite  · Round, puffy face  · Weight gain around your neck, upper back, breast, face, or waist  If you notice other side effects that you think are caused by this medicine, tell your doctor  Call your doctor for medical advice about side effects  You may report side effects to FDA at 0-582-FDA-7312  © 2017 [address] Information is for End User's use only and may not be sold, redistributed or otherwise used for commercial purposes  The above information is an  only  It is not intended as medical advice for individual conditions or treatments  Talk to your doctor, nurse or pharmacist before following any medical regimen to see if it is safe and effective for you

## 2018-12-14 NOTE — UTILIZATION REVIEW
Initial Clinical Review    Admission: Date/Time/Statement: 12/13/18 @ 0650     Orders Placed This Encounter   Procedures    Inpatient Admission     Standing Status:   Standing     Number of Occurrences:   1     Order Specific Question:   Admitting Physician     Answer:   Lroi Sanchez     Order Specific Question:   Level of Care     Answer:   Med Surg [16]     Order Specific Question:   Estimated length of stay     Answer:   More than 2 Midnights     Order Specific Question:   Certification     Answer:   I certify that inpatient services are medically necessary for this patient for a duration of greater than two midnights  See H&P and MD Progress Notes for additional information about the patient's course of treatment  ED: Date/Time/Mode of Arrival:   ED Arrival Information     Expected Arrival Acuity Means of Arrival Escorted By Service Admission Type    - 12/13/2018 03:49 Urgent Walk-In Family Member General Medicine Urgent    Arrival Complaint    Difficulty Breathing          Chief Complaint:   Chief Complaint   Patient presents with    Shortness of Breath     "feels pressure when laying down and SOB   has not been able to take medications for a few months due to no insurance"       History of Illness: 46 yo female to ED w/ c/o SOB over the last 2 weekend  Hx of asthma , lost her insurance 2 months ago and has been out of her medications which include singular , ventolin and Symbicort for the last month or so        ED Vital Signs:   ED Triage Vitals   Temperature Pulse Respirations Blood Pressure SpO2   12/13/18 0402 12/13/18 0356 12/13/18 0356 12/13/18 0356 12/13/18 0356   98 3 °F (36 8 °C) (!) 111 20 (!) 201/118 95 %      Temp Source Heart Rate Source Patient Position - Orthostatic VS BP Location FiO2 (%)   12/13/18 0402 12/13/18 0356 12/13/18 0356 12/13/18 0356 --   Oral Monitor Sitting Right arm       Pain Score       12/13/18 0356       No Pain        Wt Readings from Last 1 Encounters: 12/13/18 112 kg (245 lb 13 oz)       Vital Signs (abnormal):   12/13/18 1107  --  101  22  133/82  --  94 %  None (Room air)  Sitting   12/13/18 1046  --  94  19  --  --  94 %  --  --   12/13/18 0800  --  90  19  138/88  --  92 %  --  --   12/13/18 0700  --  94  18  145/85  --  94 %  --  --   12/13/18 0521  --  104  18  155/90  --  95 %  None (Room air)         Abnormal Labs/Diagnostic Test Results: d-dimer  1312, tsh  7 341, BNP  624, trop  0 05, K  3 3, ast   71, alt  118, alkphos  136, total prot  8 3, alb  3 4  CT chest -    1   No CT evidence of pulmonary embolism  2   Diffuse groundglass infiltrates   Differential includes alveolar edema, alveolar pneumonia or hypersensitivity pneumonitis   Clinical correlation recommended  3   4 1 cm ascending aortic aneurysm   The overall size has slightly decreased from the earlier study  CXR - Cardiomediastinal silhouette appears unremarkable  There is airspace disease throughout the right perihilar region and lower lung field, consistent with an acute infiltrate in appropriate clinical context     EKG- ST    ED Treatment:   Medication Administration from 12/13/2018 0349 to 12/13/2018 1342       Date/Time Order Dose Route Action Action by Comments     12/13/2018 0412 albuterol inhalation solution 5 mg 5 mg Nebulization Given Phyllis Rashid RN      12/13/2018 0412 ipratropium (ATROVENT) 0 02 % inhalation solution 0 5 mg 0 5 mg Nebulization Given Phyllis Rashid RN      12/13/2018 0519 labetalol (NORMODYNE) injection 10 mg 10 mg Intravenous Given Nicol Molina RN , /90     12/13/2018 1215 potassium chloride 20 mEq IVPB (premix) 20 mEq Intravenous [address] Catie Neal, [address] [place] [address]      12/13/2018 6698 furosemide (LASIX) injection 40 mg 40 mg Intravenous Given Catie Neal RN      12/13/2018 0637 iohexol (OMNIPAQUE) 350 MG/ML injection (MULTI-DOSE) 85 mL 85 mL Intravenous Given [facility]      12/13/2018 3004 methylPREDNISolone sodium succinate (Solu-MEDROL) injection 40 mg 40 mg Intravenous Given Coleman Gottron, RN      12/13/2018 1104 cefTRIAXone (ROCEPHIN) 1,000 mg in dextrose 5 % 50 mL IVPB 1,000 mg Intravenous [address] Coleman Gottron, RN      12/13/2018 0818 azithromycin (ZITHROMAX) 500 mg in sodium chloride 0 9% 250mL IVPB 500 mg 500 mg Intravenous New Bag Coleman Gottron, RN      12/13/2018 1012 budesonide-formoterol (SYMBICORT) 160-4 5 mcg/act inhaler 2 puff 2 puff Inhalation Given Coleman Gottron, RN      12/13/2018 0753 cloNIDine (CATAPRES) tablet 0 1 mg 0 1 mg Oral Given Coleman Gottron, RN      12/13/2018 1012 hydrochlorothiazide (HYDRODIURIL) tablet 25 mg 25 mg Oral Given Coleman Gottron, RN      12/13/2018 7326 thiamine (VITAMIN B1) tablet 100 mg 100 mg Oral Given Coleman Gottron, RN      12/13/2018 1012 verapamil (CALAN-SR) CR tablet 120 mg 120 mg Oral Given Coleman Gottron, RN      12/13/2018 0817 heparin (porcine) subcutaneous injection 5,000 Units 5,000 Units Subcutaneous Given Coleman Gottron, RN      12/13/2018 5936 levothyroxine tablet 75 mcg 75 mcg Oral Given Coleman Gottron, RN           Past Medical/Surgical History: Active Ambulatory Problems     Diagnosis Date Noted    Asthma 04/22/2017    HTN (hypertension) 04/22/2017    Pneumonia 04/26/2017     Past Medical History:   Diagnosis Date    Asthma     Disease of thyroid gland     Hypertension        Admitting Diagnosis: Orthopnea [R06 01]  Chest pain [R07 9]  Dyspnea [R06 00]  PND (paroxysmal nocturnal dyspnea) [R06 00]  Difficulty breathing [R06 89]  Abnormal CT of the chest [R93 89]  Elevated troponin [R74 8]    Age/Sex: 45 y o  female    Assessment/Plan:        Hypothyroidism   Assessment & Plan     C/w synthyroid  Again, not been taking this since she ran out of her meds           HTN (hypertension)   Assessment & Plan     Patient was on ARB as well as HCTZ for HTN  Ran out 2 months ago  ? Volume overload/CHF/Asthma exacerbation  Will give lasix 40 mg IV and follow up clinically  Will get Echo        Asthma   Assessment & Plan     On singular, symbicort and ventolin  Ran out of meds 2 months ago  Since then has not been taking  Likely a component of asthma exacerbation in addition to volume overload        * Orthopnea   Assessment & Plan     And dyspnea on exertion  Worse over the last 2 weeks  Volume overload/Asthma/? PNA   Will check procal   Await formal read of CT PE study and give one dose of CAP treatment for now         Anticipated Length of Stay:  Patient will be admitted on an Emergency basis with an anticipated length of stay of  More than 2 midnights     Justification for Hospital Stay: respiratory distress       Admission Orders:  Scheduled Meds:   Current Facility-Administered Medications:  albuterol 2 5 mg Nebulization Q6H PRN    budesonide-formoterol 2 puff Inhalation BID    cloNIDine 0 1 mg Oral BID    heparin (porcine) 5,000 Units Subcutaneous Q8H [facility]    hydrochlorothiazide 25 mg Oral Every Other Day    levothyroxine 75 mcg Oral Early Morning    methylPREDNISolone sodium succinate 40 mg Intravenous Q8H [facility]    montelukast 10 mg Oral Daily    thiamine 100 mg Oral Daily    verapamil 120 mg Oral Daily      Tele  Cardiology consult   EKG  pulm consult   SCD  Reg diet  1200 ml fluid restriction   I&O   12/14 cbc ,cmp gluc  161, ast 49, [id number], alk phos 119, alb 3 3  Serial trop all wnl

## 2018-12-14 NOTE — SOCIAL WORK
LOS 1  LACE 51  No 30 readmit  CM met with patient at bedside  Patient states he lives with her  and four children in their two story house  There are seven steps to enter the house from outside  Patient does not use DME  Patient is independent with ADLs  Patient does not have rehab and hhc hx  Patient fill her prescriptions with [facility] but will now [facility] prescriptions with [facility] [place] for the four dollars program   Patient has hx of depression  Patient does not have substance abuse hx  Patient does not have an advance directive  Patient was given information on AD  Patient states her dtrs-Dustin and Radhas are her health representative  Patient is employed  Patient drives  Patient wants to transfer to [address] [place] [address] doctor  Patient was provided an [employer] card  Patient is cleared for discharge  Patient will be transported home via   CM will follow patient's needs  Nurse and SLIM notified  CM reviewed discharge planning process including the following: identifying help at home, patient preference for discharge planning needs, pharmacy preference, and availability of treatment team to discuss questions or concerns patient and/or family may have regarding understanding medications and recognizing signs and symptoms once discharged  CM also encouraged patient to follow up with all recommended appointments after discharge  Patient advised of importance for patient and family to participate in managing patients medical well being  CM name and role reviewed  Discharge Checklist reviewed and CM will continue to monitor for progress toward discharge goals in nursing and provider rounds

## 2018-12-14 NOTE — PROGRESS NOTES
Progress Note - Christen Councilman 1980, 45 y o  female MRN: [id number]    Unit/Bed#: -01 Encounter: [id number]    Primary Care Provider: Washington Garza MD   Date and time admitted to hospital: 12/13/2018  3:51 AM    * [address] and dyspneic on presentation - suspect related to missed medications and mild asthma exacerbation  CTA no PE, chronic appearing changes, per pulmonology  No additional IV lasix, was on HCTZevery other day previously  No signs of fluid overload, BNP was not impressive, though was about 600     HTN (hypertension)   Assessment & Plan    Patient was on ARB as well as HCTZ for HTN  Has not been taking for 2 months  Echo shows pEF 60% and no RWMA or valvular issues  BP is better with clonidine and verapamil, continue  Hypothyroidism   Assessment & Plan    TSH elevated, T4/T3 low normal - continue previous dosing 75 mcg daily and recheck TFTs in 6 weeks       Asthma   Assessment & Plan    On singular, symbicort and ventolin; was on Symbicort? But nothing else  CT chest abnormal, chronic  Needs outpatient serology and workup, follow up with pulmonology  Afebrile, no leukocytosis, normal procalcitonin - agree with monitoring OFF abx, per pulmonology        VTE Pharmacologic Prophylaxis:   Pharmacologic: Heparin  Mechanical VTE Prophylaxis in Place: No ambulatory     Patient Centered Rounds: I have performed bedside rounds with nursing staff today  Discussions with Specialists or Other Care Team Provider:  Discussed with pulmonology, await cardiac evaluation; d/w case management regarding meds     Education and Discussions with Family / Patient:  Discussed with the patient and multiple family members at the bedside    Time Spent for Care: 30 minutes  More than 50% of total time spent on counseling and coordination of care as described above      Current Length of Stay: 1 day(s)    Current Patient Status: Inpatient   Certification Statement: The patient will continue to require additional inpatient hospital stay due to await cardiology evaluation    Discharge Plan: hopefully in next 24 hours, if not today     Code Status: Level 1 - Full Code      Subjective:   Patient seen examined, overall feeling better  She had an episode of the chest pressure/shortness of breath last night when lying completely supine  She sat up the rest night and had no further issues  She really has not been significantly ambulatory to assess any exertional component  Denies any wheezing  Does not feel she has any additional fluid  Objective:     Vitals:   Temp (24hrs), Av 3 °F (36 8 °C), Min:97 6 °F (36 4 °C), Max:99 °F (37 2 °C)    Temp:  [97 6 °F (36 4 °C)-99 °F (37 2 °C)] 97 6 °F (36 4 °C)  HR:  [] 98  Resp:  [18-22] 18  BP: (132-154)/() 132/73  SpO2:  [94 %-97 %] 94 %  Body mass index is 43 54 kg/m²  Input and Output Summary (last 24 hours): Intake/Output Summary (Last 24 hours) at 18 0805  Last data filed at 18 0501   Gross per 24 hour   Intake              360 ml   Output             2500 ml   Net            -2140 ml       Physical Exam:     Physical Exam   Constitutional: She is oriented to person, place, and time  She appears well-developed and well-nourished  No distress  HENT:   Mouth/Throat: Oropharynx is clear and moist    Cardiovascular: Normal rate, regular rhythm, S1 normal, S2 normal, normal heart sounds and intact distal pulses  No murmur heard  Pulmonary/Chest: Effort normal and breath sounds normal  No respiratory distress  She has no wheezes  She has no rales  No O2 supplement   Abdominal: Soft  Bowel sounds are normal    Musculoskeletal: She exhibits no edema or tenderness  Neurological: She is alert and oriented to person, place, and time  Psychiatric: She has a normal mood and affect  Nursing note and vitals reviewed      Additional Data:     Labs:      Results from last 7 days  Lab Units 12/14/18  0515 12/13/18  0425   WBC Thousand/uL 9 48 5 56   HEMOGLOBIN g/dL 12 8 12 9   HEMATOCRIT % 41 7 40 6   PLATELETS Thousands/uL 282 258   NEUTROS PCT %  --  51   LYMPHS PCT %  --  40   MONOS PCT %  --  5   EOS PCT %  --  3       Results from last 7 days  Lab Units 12/14/18  0515   SODIUM mmol/L 137   POTASSIUM mmol/L 4 0   CHLORIDE mmol/L 102   CO2 mmol/L 25   BUN mg/dL 18   CREATININE mg/dL 1 08   ANION GAP mmol/L 10   CALCIUM mg/dL 9 7   ALBUMIN g/dL 3 3*   TOTAL BILIRUBIN mg/dL 0 40   ALK PHOS U/L 119*   ALT U/L 106*   AST U/L 49*   GLUCOSE RANDOM mg/dL 161*                   Results from last 7 days  Lab Units 12/13/18  0830   PROCALCITONIN ng/ml <0 05           * I Have Reviewed All Lab Data Listed Above  * Additional Pertinent Lab Tests Reviewed: [facility]    Imaging:    Imaging Reports Reviewed Today Include: none  Imaging Personally Reviewed by Myself Includes:  EKG pending    Recent Cultures (last 7 days):           Last 24 Hours Medication List:     Current Facility-Administered Medications:  albuterol 2 5 mg Nebulization Q6H PRN Benjy Moon MD   budesonide-formoterol 2 puff Inhalation BID Benjy Moon MD   cloNIDine 0 1 mg Oral BID Benjy Moon MD   furosemide 40 mg Intravenous Daily Daryl Campuzano PA-C   heparin (porcine) 5,000 Units Subcutaneous [facility] Benjy Moon MD   hydrochlorothiazide 25 mg Oral Every Other Day Benjy Moon MD   levothyroxine 75 mcg Oral Early Morning Benjy Moon MD   methylPREDNISolone sodium succinate 40 mg Intravenous [facility] Benjy Moon MD   montelukast 10 mg Oral Daily Benjy Moon MD   thiamine 100 mg Oral Daily Benjy Moon MD   verapamil 120 mg Oral Daily Benjy Moon MD        Today, Patient Was Seen By: Daryl Campuzano PA-C    ** Please Note: Dictation voice to text software may have been used in the creation of this document   **

## 2018-12-14 NOTE — ASSESSMENT & PLAN NOTE
Orthopneic and dyspneic on presentation - suspect related to missed medications and mild asthma exacerbation  CTA no PE, chronic appearing changes, per pulmonology    Pulmonology evaluated patient - no acute changes at this time, short steroid taper on discharge  Cardiology evaluated patient - no acute changes at this time, recommend adherence to medications, and sleep study outpatient    Cleared for discharge

## 2018-12-14 NOTE — ASSESSMENT & PLAN NOTE
Patient was on ARB as well as HCTZ for HTN  Has not been taking for 2 months  Echo shows pEF 60% and no RWMA or valvular issues  BP is better with clonidine and verapamil, continue

## 2018-12-14 NOTE — CONSULTS
Consultation - Cardiology Team One  Lu Arrington 45 y o  female MRN: [id number]  Unit/Bed#: -01 Encounter: [id number]    Consults    Physician Requesting Consult: Janessa Borjas, *  Reason for Consult / Principal Problem:  Shortness of breath/PND    HPI: Cardiologist Dr Tori Joya is a 45y o  year old female who has a history of asthma, hypertension, thoracic aortic aneurysm  Patient came to the emergency room with complaints of shortness of breath over the last 2 weeks  She states she gets short of breath with any kind of exertion  Patient states that when she goes up the stairs in her home she is short of breath by the time she gets to the top  She also notes that she has been having trouble lying flat in bed because of shortness of breath  She states she has had to prop herself off with multiple pillows and is up to 3 pillows just to sleep  She denies any weight gain  She denies any leg swelling  She has no personal history of CAD, congestive heart failure  Patient does admit that she was previously on HCTZ as well as an Arb and about 2 months ago her insurance lapsed and she was unable to afford any of these medications  She also notes at the same time she was unable to use Singulair, Ventolin, Symbicort because of the insurance lapse       REVIEW OF SYSTEMS:  Constitutional:  Denies fever or chills   Eyes:  Denies change in visual acuity   HENT:  Denies nasal congestion or sore throat   Respiratory:  + shortness of breath, dyspnea on exertion, orthopnea  Cardiovascular:  Denies chest pain or edema   GI:  Denies abdominal pain, nausea, vomiting, bloody stools or diarrhea   :  Denies dysuria, frequency, difficulty in micturition and nocturia  Musculoskeletal:  Denies back pain or joint pain   Neurologic:  Denies headache, focal weakness or sensory changes   Endocrine:  Denies polyuria or polydipsia   Lymphatic:  Denies swollen glands   Psychiatric:  Denies depression or anxiety     Historical Information   Past Medical History:   Diagnosis Date    Asthma     Disease of thyroid gland     Hypertension      Past Surgical History:   Procedure Laterality Date    CARPAL TUNNEL RELEASE       SECTION      4 c section    HYSTERECTOMY      LAPAROSCOPIC GASTRIC BANDING      VOCAL CORD LATERALIZATION, ENDOSCOPIC APPROACH W/ MLB       History   Alcohol Use No     History   Drug Use No     History   Smoking Status    Never Smoker   Smokeless Tobacco    Never Used       Family History:   Family History   Problem Relation Age of Onset    Family history unknown: Yes       MEDS & ALLERGIES:  all current active meds have been reviewed and current meds: Current Facility-Administered Medications   Medication Dose Route Frequency    acetaminophen (TYLENOL) tablet 650 mg  650 mg Oral Q6H PRN    albuterol inhalation solution 2 5 mg  2 5 mg Nebulization Q6H PRN    budesonide-formoterol (SYMBICORT) 160-4 5 mcg/act inhaler 2 puff  2 puff Inhalation BID    cloNIDine (CATAPRES) tablet 0 1 mg  0 1 mg Oral BID    heparin (porcine) subcutaneous injection 5,000 Units  5,000 Units Subcutaneous Q8H [facility]    hydrochlorothiazide (HYDRODIURIL) tablet 25 mg  25 mg Oral Every Other Day    levothyroxine tablet 75 mcg  75 mcg Oral Early Morning    montelukast (SINGULAIR) tablet 10 mg  10 mg Oral Daily    [START ON 12/15/2018] predniSONE tablet 40 mg  40 mg Oral Daily    thiamine (VITAMIN B1) tablet 100 mg  100 mg Oral Daily    verapamil (CALAN-SR) CR tablet 120 mg  120 mg Oral Daily        Allergies   Allergen Reactions    Oxycodone Anaphylaxis    Lisinopril        OBJECTIVE:  Vitals:   Vitals:    18 0654   BP: 132/73   Pulse: 98   Resp: 18   Temp: 97 6 °F (36 4 °C)   SpO2: 94%     Body mass index is 43 54 kg/m²      Systolic (21CKT), RSU:292 , Min:132 , IPI:901     Diastolic (49DGQ), ULP:92, Min:73, Max:84      Intake/Output Summary (Last 24 hours) at 18 1642  Last data filed at 12/14/18 1101   Gross per 24 hour   Intake                0 ml   Output             3100 ml   Net            -3100 ml     Weight (last 2 days)     Date/Time   Weight    12/13/18 0356  112 (245 81)            Invasive Devices          No matching active lines, drains, or airways          PHYSICAL EXAMS:  General:  Patient is not in acute distress, laying in the bed comfortably, awake, alert responding to commands  Head: Normocephalic, Atraumatic  HEENT: White sclera, pink conjunctiva,  PERRLA,pharynx benign  Neck:  Supple, no neck vein distention, carotids+2/+2 no bruits, thyromegaly, adenopathy  Respiratory: clear to P/A  Cardiovascular:  PMI normal, S1-S2 normal, No  Murmurs, thrills, gallops, rubs   Regular rhythm  GI:  Abdomen soft nontender   No hepatosplenomegaly, adenopathy, ascites,or rebound tenderness  Extremities: No edema, normal pulses, no calf tenderness, no joint deformities, no venous disease   Integument:  No skin rashes or ulceration  Lymphatic:  No cervical or inguinal lymphadenopathy  Neurologic:  Patient is awake alert, responding to command, well-oriented to time and place and person moving all extremities    LABORATORY RESULTS:    Results from last 7 days  Lab Units 12/13/18  1735 12/13/18  0425   TROPONIN I ng/mL <0 02 0 05*     CBC with diff:   Results from last 7 days  Lab Units 12/14/18  0515 12/13/18  0425   WBC Thousand/uL 9 48 5 56   HEMOGLOBIN g/dL 12 8 12 9   HEMATOCRIT % 41 7 40 6   MCV fL 85 82   PLATELETS Thousands/uL 282 258   MCH pg 26 0* 26 0*   MCHC g/dL 30 7* 31 8   RDW % 15 1 14 8   MPV fL 11 9 12 1   NRBC AUTO /100 WBCs  --  0       CMP:  Results from last 7 days  Lab Units 12/14/18  0515 12/13/18  0425   POTASSIUM mmol/L 4 0 3 3*   CHLORIDE mmol/L 102 104   CO2 mmol/L 25 30   BUN mg/dL 18 13   CREATININE mg/dL 1 08 0 97   CALCIUM mg/dL 9 7 8 9   AST U/L 49* 71*   ALT U/L 106* 118*   ALK PHOS U/L 119* 136*   EGFR ml/min/1 73sq m 65 74       BMP:  Results from last 7 days  Lab Units 18  0515 18  0425   POTASSIUM mmol/L 4 0 3 3*   CHLORIDE mmol/L 102 104   CO2 mmol/L 25 30   BUN mg/dL 18 13   CREATININE mg/dL 1 08 0 97   CALCIUM mg/dL 9 7 8 9         Results from last 7 days  Lab Units 18  0425   NT-PRO BNP pg/mL 624*                Results from last 7 days  Lab Units 18  0425   TSH 3RD GENERATON uIU/mL 7 341*   T3 FREE pg/mL 2 39   FREE T4 ng/dL 0 87           Lipid Profile:   No results found for: CHOL  Lab Results   Component Value Date    HDL 81 (H) 2017     Lab Results   Component Value Date    LDLCALC 83 2017     Lab Results   Component Value Date    TRIG 33 2017       Cardiac testing:   Results for orders placed during the hospital encounter of 18   Echo complete with contrast if indicated    Narrative [address] [place] [id number]  [phone]    Transthoracic Echocardiogram  2D, M-mode, Doppler, and Color Doppler    Study date:  13-Dec-2018    Patient: Bud Dominguez  MR number: [id number]  Account number: [de-identified]  : 1980  Age: 45 years  Gender: Female  Status: Inpatient  Location: Emergency room  Height: 63 in  Weight: 245 lb  BP: 145/ 85 mmHg    Indications: Dyspnea, Wheezing, Tachypnea, Shortness of breath  Diagnoses: R06 00 - Dyspnea, unspecified, R06 02 - Shortness of breath    Sonographer:  April Fatoumata [place], [place]  Referring Physician:  Tej Thomas MD  Group:  [facility]  Interpreting Physician:  Suyapa Souza MD    SUMMARY    LEFT VENTRICLE:  Ejection fraction was estimated to be 65 %  There were no regional wall motion abnormalities  MITRAL VALVE:  There was mild regurgitation  AORTIC VALVE:  There was trace regurgitation  TRICUSPID VALVE:  There was mild regurgitation  HISTORY: Symptoms: chest pain  PRIOR HISTORY: Shortness of breath, Pneumonia, Risk factors: hypertension and morbid obesity      PROCEDURE: The procedure was performed in the emergency room  This was a routine study  The transthoracic approach was used  The study included complete 2D imaging, M-mode, complete spectral Doppler, and color Doppler  The heart rate was  100 bpm, at the start of the study  Images were obtained from the parasternal, apical, subcostal, and suprasternal notch acoustic windows  Image quality was adequate  LEFT VENTRICLE: Size was normal  Ejection fraction was estimated to be 65 %  There were no regional wall motion abnormalities  DOPPLER: Left ventricular diastolic function parameters were normal     RIGHT VENTRICLE: The size was normal  Systolic function was normal  Wall thickness was normal     LEFT ATRIUM: Size was normal     RIGHT ATRIUM: Size was normal     MITRAL VALVE: Valve structure was normal  There was normal leaflet separation  DOPPLER: The transmitral velocity was within the normal range  There was no evidence for stenosis  There was mild regurgitation  AORTIC VALVE: The valve was trileaflet  Leaflets exhibited normal thickness and normal cuspal separation  DOPPLER: Transaortic velocity was within the normal range  There was no evidence for stenosis  There was trace regurgitation  TRICUSPID VALVE: The valve structure was normal  There was normal leaflet separation  DOPPLER: The transtricuspid velocity was within the normal range  There was no evidence for stenosis  There was mild regurgitation  PULMONIC VALVE: Leaflets exhibited normal thickness, no calcification, and normal cuspal separation  DOPPLER: The transpulmonic velocity was within the normal range  There was no regurgitation  PERICARDIUM: There was no pericardial effusion  The pericardium was normal in appearance  AORTA: The root exhibited normal size      SYSTEM MEASUREMENT TABLES    2D  %FS: 29 3 %  Ao Diam: 3 1 cm  EDV(Teich): 100 7 ml  EF(Teich): 56 2 %  ESV(Teich): 44 1 ml  IVSd: 1 1 cm  LA Area: 18 4 cm2  LA Diam: 4 cm  LVEDV MOD A4C: 177 4 ml  LVEF MOD A4C: 60 %  LVESV MOD A4C: 71 ml  LVIDd: 4 7 cm  LVIDs: 3 3 cm  LVLd A4C: 8 9 cm  LVLs A4C: 7 9 cm  LVPWd: 0 9 cm  RA Area: 11 4 cm2  RVIDd: 3 cm  SV MOD A4C: 106 5 ml  SV(Teich): 56 6 ml    CW  AR Dec Nod[place]: 3 5 m/s2  AR Dec Time: 1212 4 ms  AR PHT: 351 6 ms  AR Vmax: 4 2 m/s  AR maxP 8 mmHg    MM  TAPSE: 2 2 cm    PW  E': 0 m/s  E/E': 21 2  MV A Irving: 1 1 m/s  MV Dec N[place]: 8 7 m/s2  MV DecT: 103 1 ms  MV E Irving: 0 9 m/s  MV E/A Ratio: 0 8  MV PHT: 29 9 ms  MVA By PHT: 7 4 cm2    [facility]    Prepared and electronically signed by    Cristofer Stoner MD  Signed 13-Dec-2018 17:50:59       No results found for this or any previous visit  No procedure found  No results found for this or any previous visit  Imaging:   I have personally reviewed pertinent reports  EKG reviewed personally:  Sinus tachycardia, biatrial enlargement, LVH, ST and T-wave abnormalities,    Assessment/Plan:  1-dyspnea/orthopnea without evidence of congestive heart failure  Patient has a history of asthma  Patient will need to be worked up for obstructive sleep apnea as she has daytime sleepiness, easily falls asleep, snores  Patient will need outpatient sleep study  Echocardiogram done, EF 65%  Mild MR, trace AR, mild TR  Patient had cardiac catheterization 2017, minor luminal irregularities noted in the LAD as well as the circumflex  No need for any further cardiac testing at this time  2-hypertension, stable  Continue with current regimen of medications  Discussed with [facility] advanced practitioner and all medications will be available through the [employer] 4 dollar plan  Patient is stable from a cardiac standpoint for discharge  Will follow up with the patient in the office  Code Status: Level 1 - Full Code    Counseling / Coordination of Care  Total floor / unit time spent today 35 minutes  Greater than 50% of total time was spent with the patient and / or family counseling and / or coordination of care  A description of the counseling / coordination of care: Review of history, current assessment, development of a plan      Monster Gonzales PA-C  12/14/2018,4:42 PM

## 2018-12-14 NOTE — ASSESSMENT & PLAN NOTE
On singular, symbicort - having difficulty obtaining medications due to cost  CT chest abnormal, chronic    Needs outpatient serology and workup, follow up with pulmonology and Rheumatology  Short steroid taper, no antibiotics on discharge

## 2018-12-15 NOTE — PHYSICIAN ADVISOR
Current patient class: Inpatient  The patient is currently on Hospital Day: 2 at [address]      The patient was admitted to the hospital at [phone] on 12/13/18 for the following diagnosis:  Orthopnea [R06 01]  Chest pain [R07 9]  Dyspnea [R06 00]  PND (paroxysmal nocturnal dyspnea) [R06 00]  Difficulty breathing [R06 89]  Abnormal CT of the chest [R93 89]  Elevated troponin [R74 8]       There is documentation in the medical record of an expected length of stay of at least 2 midnights  The patient is therefore expected to satisfy the 2 midnight benchmark and given the 2 midnight presumption is appropriate for INPATIENT ADMISSION  Given this expectation of a satisfying stay, CMS instructs us that the patient is most often appropriate for inpatient admission under part A provided medical necessity is documented in the chart  After review of the relevant documentation, labs, vital signs and test results, the patient is appropriate for INPATIENT ADMISSION  Admission to the hospital as an inpatient is a complex decision making process which requires the practitioner to consider the patients presenting complaint, history and physical examination and all relevant testing  With this in mind, in this case, the patient was deemed appropriate for INPATIENT ADMISSION  After review of the documentation and testing available at the time of the admission I concur with this clinical determination of medical necessity  Rationale is as follows: The patient is a 45 yrs old Female who presented to the ED at 12/13/2018  3:51 AM with a chief complaint of Shortness of Breath ("feels pressure when laying down and SOB   has not been able to take medications for a few months due to no insurance")     Given the need for further hospitalization, and along with the documentation of medical necessity present in the chart, the patient is appropriate for inpatient admission    The patient is expected to satisfy the 2 midnight benchmark, and will require further acute medical care  The patient does have comorbid conditions which increases the risk for significant adverse outcome  Given this the patient is appropriate for inpatient admission  The patients vitals on arrival were ED Triage Vitals   Temperature Pulse Respirations Blood Pressure SpO2   18 0402 18 0356 18 0356 18 0356 18 0356   98 3 °F (36 8 °C) (!) 111 20 (!) 201/118 95 %      Temp Source Heart Rate Source Patient Position - Orthostatic VS BP Location FiO2 (%)   18 0402 18 0356 18 0356 18 0356 --   Oral Monitor Sitting Right arm       Pain Score       18 035       No Pain           Past Medical History:   Diagnosis Date    Asthma     Disease of thyroid gland     Hypertension      Past Surgical History:   Procedure Laterality Date    CARPAL TUNNEL RELEASE       SECTION      4 c section    HYSTERECTOMY      LAPAROSCOPIC GASTRIC BANDING      VOCAL CORD LATERALIZATION, ENDOSCOPIC APPROACH W/ MLB             Consults have been placed to:   IP CONSULT TO CASE MANAGEMENT  IP CONSULT TO PULMONOLOGY  IP CONSULT TO CARDIOLOGY  IP CONSULT TO CARDIOLOGY    Vitals:    18 2110 18 2346 18 0654 18 1700   BP: 148/84 149/78 132/73 141/94   BP Location:  Left arm Right arm Left arm   Pulse: 101 (!) 111 98 100   Resp:  18   Temp:  98 6 °F (37 °C) 97 6 °F (36 4 °C) 97 7 °F (36 5 °C)   TempSrc:  Oral Oral Oral   SpO2:  97% 94% 97%   Weight:           Most recent labs:    Recent Labs      18   1735  18   0515   WBC   --   9 48   HGB   --   12 8   HCT   --   41 7   PLT   --   282   K   --   4 0   CALCIUM   --   9 7   BUN   --   18   CREATININE   --   1 08   TROPONINI  <0 02   --    AST   --   49*   ALT   --   106*   ALKPHOS   --   119*       Scheduled Meds:  Continuous Infusions:  No current facility-administered medications for this encounter     PRN Meds:     Surgical procedures (if appropriate):

## 2019-08-12 ENCOUNTER — HOSPITAL ENCOUNTER (EMERGENCY)
Facility: HOSPITAL | Age: 39
Discharge: HOME | End: 2019-08-12
Attending: EMERGENCY MEDICINE
Payer: COMMERCIAL

## 2019-08-12 VITALS
OXYGEN SATURATION: 96 % | DIASTOLIC BLOOD PRESSURE: 84 MMHG | HEART RATE: 88 BPM | RESPIRATION RATE: 18 BRPM | HEIGHT: 63 IN | SYSTOLIC BLOOD PRESSURE: 133 MMHG | TEMPERATURE: 99.1 F | WEIGHT: 243.17 LBS | BODY MASS INDEX: 43.09 KG/M2

## 2019-08-12 DIAGNOSIS — H01.006 BLEPHARITIS OF EYELID OF LEFT EYE: Primary | ICD-10-CM

## 2019-08-12 DIAGNOSIS — H00.014 HORDEOLUM EXTERNUM OF LEFT UPPER EYELID: ICD-10-CM

## 2019-08-12 PROCEDURE — 99283 EMERGENCY DEPT VISIT LOW MDM: CPT | Performed by: EMERGENCY MEDICINE

## 2019-08-12 PROCEDURE — 99283 EMERGENCY DEPT VISIT LOW MDM: CPT

## 2019-08-12 RX ORDER — SULFACETAMIDE SODIUM 100 MG/ML
1-2 SOLUTION/ DROPS OPHTHALMIC 4 TIMES DAILY
Qty: 15 ML | Refills: 0 | Status: SHIPPED | OUTPATIENT
Start: 2019-08-12 | End: 2019-08-19

## 2019-08-12 NOTE — ED NOTES
D/c instructions reviewed with verbal understanding returned  Pt ambulated to the door with a steady gait         Lo Frye RN  08/12/19 [id number]

## 2019-08-12 NOTE — ED PROVIDER NOTES
History  Chief Complaint   Patient presents with    Eye Pain     Patient presents with left eye pain and swelling with clear drainage since Sunday morning  51-year-old male with past medical history significant for hypertension, asthma and thyroid disease presents to the emergency department with chief complaint of left eyelid swelling  Onset of symptoms reported as 2 days ago  Location of symptoms reported as the left eye  Quality is reported as eyelid swelling with clear drainage  Severity is reported as mild to moderate  Associated symptoms:  Denies facial swelling  Denies headache  Denies fevers  Denies sore throat  Denies ear pain  Denies loss of vision  Denies photophobia  Modifying factors:  Patient reports eye open exacerbates symptoms  Context:  Patient denies any recent trauma or injury  She has not were contact lenses for 2 weeks and has been wearing glasses  Patient is concerned that she has conjunctivitis  History provided by:  Patient   used: No        Prior to Admission Medications   Prescriptions Last Dose Informant Patient Reported? Taking?    albuterol (PROVENTIL HFA,VENTOLIN HFA) 90 mcg/act inhaler   Yes No   Sig: Inhale 2 puffs every 6 (six) hours as needed for wheezing   budesonide-formoterol (SYMBICORT) 160-4 5 mcg/act inhaler   Yes No   Sig: Inhale 2 puffs 2 (two) times a day   cloNIDine (CATAPRES) 0 1 mg tablet   No No   Sig: Take 1 tablet (0 1 mg total) by mouth every 12 (twelve) hours for 90 days   hydrochlorothiazide (HYDRODIURIL) 25 mg tablet   No No   Sig: Take 1 tablet (25 mg total) by mouth daily   levothyroxine 75 mcg tablet   No No   Sig: Take 1 tablet (75 mcg total) by mouth daily in the early morning   montelukast (SINGULAIR) 10 mg tablet   No No   Sig: Take 1 tablet (10 mg total) by mouth daily at bedtime   potassium chloride (K-DUR,KLOR-CON) 10 mEq tablet   Yes No   Sig: Take 10 mEq by mouth daily     predniSONE 20 mg tablet   No No Sig: 40 x 2 days then 30 x 3, 20 x 3 and stop   thiamine 100 MG tablet   Yes No   Sig: Take 100 mg by mouth daily   verapamil (CALAN-SR) 180 mg CR tablet   No No   Sig: Take 1 tablet (180 mg total) by mouth daily      Facility-Administered Medications: None       Past Medical History:   Diagnosis Date    Asthma     Disease of thyroid gland     Hypertension        Past Surgical History:   Procedure Laterality Date    CARPAL TUNNEL RELEASE       SECTION      4 c section    HYSTERECTOMY      LAPAROSCOPIC GASTRIC BANDING      VOCAL CORD LATERALIZATION, ENDOSCOPIC APPROACH W/ MLB         Family History   Family history unknown: Yes     I have reviewed and agree with the history as documented  Social History     Tobacco Use    Smoking status: Never Smoker    Smokeless tobacco: Never Used   Substance Use Topics    Alcohol use: No    Drug use: No        Review of Systems   Constitutional: Negative for activity change, appetite change, chills, diaphoresis, fatigue, fever and unexpected weight change  HENT: Negative for congestion, dental problem, drooling, ear discharge, ear pain, facial swelling, hearing loss, mouth sores, nosebleeds, postnasal drip, rhinorrhea, sinus pressure, sinus pain, sneezing, sore throat, tinnitus, trouble swallowing and voice change  Eyes: Positive for discharge and redness  Negative for photophobia, pain, itching and visual disturbance  Respiratory: Negative for apnea, cough, choking, chest tightness, shortness of breath, wheezing and stridor  Cardiovascular: Negative for chest pain, palpitations and leg swelling  Gastrointestinal: Negative for abdominal distention, abdominal pain, anal bleeding, blood in stool, constipation, diarrhea, nausea, rectal pain and vomiting  Endocrine: Negative for cold intolerance, heat intolerance, polydipsia, polyphagia and polyuria     Genitourinary: Negative for decreased urine volume, difficulty urinating, dyspareunia, dysuria, enuresis, flank pain, frequency, hematuria, menstrual problem, pelvic pain, urgency, vaginal bleeding, vaginal discharge and vaginal pain  Musculoskeletal: Negative for arthralgias, back pain, gait problem, joint swelling, myalgias, neck pain and neck stiffness  Skin: Negative for color change, pallor, rash and wound  Allergic/Immunologic: Negative for environmental allergies, food allergies and immunocompromised state  Neurological: Negative for dizziness, tremors, seizures, syncope, facial asymmetry, speech difficulty, weakness, light-headedness, numbness and headaches  Hematological: Negative for adenopathy  Does not bruise/bleed easily  Psychiatric/Behavioral: Negative for agitation, confusion, hallucinations, self-injury and suicidal ideas  The patient is not hyperactive  All other systems reviewed and are negative  Physical Exam  Physical Exam   Constitutional: She is oriented to person, place, and time  She appears well-developed and well-nourished  No distress  /84 (BP Location: Left arm)   Pulse 88   Temp 99 1 °F (37 3 °C) (Oral)   Resp 18   Ht 5' 3" (1 6 m)   Wt 110 kg (243 lb 2 7 oz)   SpO2 96%   BMI 43 08 kg/m²    HENT:   Head: Normocephalic and atraumatic  Right Ear: External ear normal    Left Ear: External ear normal    Nose: Nose normal    Mouth/Throat: Oropharynx is clear and moist  No oropharyngeal exudate  Eyes: Pupils are equal, round, and reactive to light  EOM are normal  Right eye exhibits discharge  Left eye exhibits no discharge  No scleral icterus  Conjunctiva on the left is injected edematous  The left upper eyelid is mildly edematous  There is a small pustule present to the middle of the eyelid consistent with stye  Pupils 3 mm equal round reactive to light bilaterally  EOMI, no visual field cuts  Red reflex present bilaterally  Normal tracking  No ptosis or proptosis  No periorbital ecchymosis or erythema or swelling     Neck: Normal range of motion  Neck supple  No JVD present  No tracheal deviation present  No thyromegaly present  Cardiovascular: Normal rate, regular rhythm and intact distal pulses  Pulmonary/Chest: Effort normal and breath sounds normal  No stridor  No respiratory distress  She has no wheezes  She has no rales  She exhibits no tenderness  Abdominal: Soft  Bowel sounds are normal  She exhibits no distension and no mass  There is no tenderness  There is no rebound and no guarding  No hernia  Musculoskeletal: Normal range of motion  She exhibits no edema, tenderness or deformity  Lymphadenopathy:     She has no cervical adenopathy  Neurological: She is alert and oriented to person, place, and time  She displays normal reflexes  No cranial nerve deficit or sensory deficit  She exhibits normal muscle tone  Coordination normal    Skin: Skin is warm and dry  Capillary refill takes less than 2 seconds  No rash noted  She is not diaphoretic  No erythema  No pallor  Psychiatric: She has a normal mood and affect  Her behavior is normal  Judgment and thought content normal    Nursing note and vitals reviewed  Vital Signs  ED Triage Vitals [08/12/19 1000]   Temperature Pulse Respirations Blood Pressure SpO2   99 1 °F (37 3 °C) 88 18 133/84 96 %      Temp Source Heart Rate Source Patient Position - Orthostatic VS BP Location FiO2 (%)   Oral Monitor Sitting Left arm --      Pain Score       5           Vitals:    08/12/19 1000   BP: 133/84   Pulse: 88   Patient Position - Orthostatic VS: Sitting         Visual Acuity  Visual Acuity      Most Recent Value   Visual acuity R eye is  20/30   Visual acuity Left eye is  20/30   Visual acuity in both eyes is  20/30   Visual acuity uncorrected  20/100   Wearing corrective eyewear/lenses?   Yes   L Pupil Size (mm)  3   R Pupil Size (mm)  3   L Pupil Shape  Round   R Pupil Shape  Round          ED Medications  Medications - No data to display    Diagnostic Studies  Results Reviewed None                 No orders to display              Procedures  Procedures       ED Course                               MDM  Number of Diagnoses or Management Options  Blepharitis of eyelid of left eye: new and requires workup  Hordeolum externum of left upper eyelid: new and requires workup  Diagnosis management comments: ddx includes but is not limited to corneal abrasion, corneal ulcer, uveitis, conjunctivitis, iritis, foreign body, corneal laceration, episcleritis, herpes zoster, dry eye, consider but doubt hyphema, glaucoma, globe rupture    Visual acuity reviewed, left eye is 20/30, right eye is 20/30, both eyes are 23  No visual acuity deficits  I discussed with patient symptoms appear most consistent with conjunctivitis and blepharitis of the left eye  She has no visual field deficits  No recent trauma  She has not been wearing her contact lenses for 2 weeks  She has had symptoms for 2 days  Discussed treatment with topical antibiotic eyedrops  Discussed outpatient follow up with ophthalmology in 1-2 days for recheck and further evaluation of symptoms  Discussed avoid rubbing the eye, discussed no contact lenses until cleared by Ophthalmology  Warm compresses for stye  Reviewed reasons to return to ed  Patient verbalized understanding of diagnosis and agreement with discharge plan of care as well as understanding of reasons to return to ed              Amount and/or Complexity of Data Reviewed  Review and summarize past medical records: yes    Patient Progress  Patient progress: stable      Disposition  Final diagnoses:   Blepharitis of eyelid of left eye   Hordeolum externum of left upper eyelid     Time reflects when diagnosis was documented in both MDM as applicable and the Disposition within this note     Time User Action Codes Description Comment    8/12/2019 10:40 AM Alfredia Cockayne Add [H01 006] Blepharitis of eyelid of left eye     8/12/2019 10:41 AM Alfredia Cockayne Add [[id number]] Hordeolum externum of left upper eyelid       ED Disposition     ED Disposition Condition Date/Time Comment    Discharge Stable Mon Aug 12, 2019 10:38 AM Aaron Salinas discharge to home/self care              Follow-up Information     Follow up With Specialties Details Why Contact Info Additional Information    Maricarmen Augustine MD Internal Medicine Call in 1 day for further evaluation of symptoms Attn: THANG Duffy   [address] [place] [address] [phone] [facility] Emergency Department Emergency Medicine Go to  If symptoms worsen [address] [facility] Lisha Lopez 1490 ED, [address] [place], [place], [address], MD Ophthalmology   [address] [facility]  [phone]             Discharge Medication List as of 8/12/2019 10:43 AM      START taking these medications    Details   sulfacetamide (BLEPH-10) 10 % ophthalmic solution Administer 1-2 drops into the left eye 4 (four) times a day for 7 days, Starting Mon 8/12/2019, Until Mon 8/19/2019, Print         CONTINUE these medications which have NOT CHANGED    Details   albuterol (PROVENTIL HFA,VENTOLIN HFA) 90 mcg/act inhaler Inhale 2 puffs every 6 (six) hours as needed for wheezing, Until Discontinued, Historical Med      budesonide-formoterol (SYMBICORT) 160-4 5 mcg/act inhaler Inhale 2 puffs 2 (two) times a day, Until Discontinued, Historical Med      cloNIDine (CATAPRES) 0 1 mg tablet Take 1 tablet (0 1 mg total) by mouth every 12 (twelve) hours for 90 days, Starting Fri 12/14/2018, Until Thu 3/14/2019, Normal      hydrochlorothiazide (HYDRODIURIL) 25 mg tablet Take 1 tablet (25 mg total) by mouth daily, Starting Fri 12/14/2018, Normal      levothyroxine 75 mcg tablet Take 1 tablet (75 mcg total) by mouth daily in the early morning, Starting Sat 12/15/2018, Normal      montelukast (SINGULAIR) 10 mg tablet Take 1 tablet (10 mg total) by mouth daily at bedtime, Starting Fri 12/14/2018, Normal      potassium chloride (K-DUR,KLOR-CON) 10 mEq tablet Take 10 mEq by mouth daily  , Until Discontinued, Historical Med      predniSONE 20 mg tablet 40 x 2 days then 30 x 3, 20 x 3 and stop, Normal      thiamine 100 MG tablet Take 100 mg by mouth daily, Until Discontinued, Historical Med      verapamil (CALAN-SR) 180 mg CR tablet Take 1 tablet (180 mg total) by mouth daily, Starting Fri 12/14/2018, Normal           No discharge procedures on file      ED Provider  Electronically Signed by           Van France PA-C  08/13/19 [id number]

## 2020-12-23 ENCOUNTER — OCCMED (OUTPATIENT)
Dept: URGENT CARE | Facility: CLINIC | Age: 40
End: 2020-12-23

## 2020-12-23 DIAGNOSIS — Z02.1 PHYSICAL EXAM, PRE-EMPLOYMENT: Primary | ICD-10-CM

## 2020-12-23 PROCEDURE — 86480 TB TEST CELL IMMUN MEASURE: CPT

## 2020-12-25 LAB
GAMMA INTERFERON BACKGROUND BLD IA-ACNC: 0.03 IU/ML
M TB IFN-G BLD-IMP: NEGATIVE
M TB IFN-G CD4+ BCKGRND COR BLD-ACNC: 0 IU/ML
M TB IFN-G CD4+ BCKGRND COR BLD-ACNC: 0 IU/ML
MITOGEN IGNF BCKGRD COR BLD-ACNC: >10 IU/ML

## 2021-01-05 ENCOUNTER — APPOINTMENT (EMERGENCY)
Dept: RADIOLOGY | Facility: HOSPITAL | Age: 41
End: 2021-01-05
Payer: COMMERCIAL

## 2021-01-05 ENCOUNTER — HOSPITAL ENCOUNTER (EMERGENCY)
Facility: HOSPITAL | Age: 41
Discharge: HOME | End: 2021-01-05
Attending: EMERGENCY MEDICINE | Admitting: EMERGENCY MEDICINE
Payer: COMMERCIAL

## 2021-01-05 VITALS
HEART RATE: 98 BPM | BODY MASS INDEX: 42.97 KG/M2 | RESPIRATION RATE: 22 BRPM | HEIGHT: 63 IN | TEMPERATURE: 98 F | OXYGEN SATURATION: 100 % | SYSTOLIC BLOOD PRESSURE: 130 MMHG | DIASTOLIC BLOOD PRESSURE: 87 MMHG | WEIGHT: 242.51 LBS

## 2021-01-05 DIAGNOSIS — Z20.822 ENCOUNTER FOR LABORATORY TESTING FOR COVID-19 VIRUS: ICD-10-CM

## 2021-01-05 DIAGNOSIS — J45.901 ASTHMA EXACERBATION: Primary | ICD-10-CM

## 2021-01-05 DIAGNOSIS — J40 BRONCHITIS: ICD-10-CM

## 2021-01-05 LAB
ALBUMIN SERPL BCP-MCNC: 4 G/DL (ref 3.4–4.8)
ALP SERPL-CCNC: 97.4 U/L (ref 35–140)
ALT SERPL W P-5'-P-CCNC: 29 U/L (ref 5–54)
ANION GAP SERPL CALCULATED.3IONS-SCNC: 8 MMOL/L (ref 4–13)
AST SERPL W P-5'-P-CCNC: 25 U/L (ref 15–41)
BASOPHILS # BLD AUTO: 0.05 THOUSANDS/ΜL (ref 0–0.1)
BASOPHILS NFR BLD AUTO: 1 % (ref 0–1)
BILIRUB SERPL-MCNC: 0.3 MG/DL (ref 0.3–1.2)
BUN SERPL-MCNC: 11 MG/DL (ref 6–20)
CALCIUM SERPL-MCNC: 8.9 MG/DL (ref 8.4–10.2)
CHLORIDE SERPL-SCNC: 106 MMOL/L (ref 96–108)
CO2 SERPL-SCNC: 23 MMOL/L (ref 22–33)
CREAT SERPL-MCNC: 0.96 MG/DL (ref 0.4–1.1)
EOSINOPHIL # BLD AUTO: 0.05 THOUSAND/ΜL (ref 0–0.61)
EOSINOPHIL NFR BLD AUTO: 1 % (ref 0–6)
ERYTHROCYTE [DISTWIDTH] IN BLOOD BY AUTOMATED COUNT: 13.8 % (ref 11.6–15.1)
GFR SERPL CREATININE-BSD FRML MDRD: 74 ML/MIN/1.73SQ M
GLUCOSE SERPL-MCNC: 93 MG/DL (ref 65–140)
HCT VFR BLD AUTO: 39.7 % (ref 34.8–46.1)
HGB BLD-MCNC: 13.2 G/DL (ref 11.5–15.4)
IMM GRANULOCYTES # BLD AUTO: 0 THOUSAND/UL (ref 0–0.2)
IMM GRANULOCYTES NFR BLD AUTO: 0 % (ref 0–2)
LYMPHOCYTES # BLD AUTO: 1.8 THOUSANDS/ΜL (ref 0.6–4.47)
LYMPHOCYTES NFR BLD AUTO: 43 % (ref 14–44)
MCH RBC QN AUTO: 27.6 PG (ref 26.8–34.3)
MCHC RBC AUTO-ENTMCNC: 33.2 G/DL (ref 31.4–37.4)
MCV RBC AUTO: 83 FL (ref 82–98)
MONOCYTES # BLD AUTO: 0.44 THOUSAND/ΜL (ref 0.17–1.22)
MONOCYTES NFR BLD AUTO: 10 % (ref 4–12)
NEUTROPHILS # BLD AUTO: 1.9 THOUSANDS/ΜL (ref 1.85–7.62)
NEUTS SEG NFR BLD AUTO: 45 % (ref 43–75)
PLATELET # BLD AUTO: 193 THOUSANDS/UL (ref 149–390)
PMV BLD AUTO: 11.3 FL (ref 8.9–12.7)
POTASSIUM SERPL-SCNC: 4.1 MMOL/L (ref 3.5–5)
PROT SERPL-MCNC: 7.7 G/DL (ref 6.4–8.3)
RBC # BLD AUTO: 4.78 MILLION/UL (ref 3.81–5.12)
SODIUM SERPL-SCNC: 137 MMOL/L (ref 133–145)
TROPONIN I SERPL-MCNC: <0.03 NG/ML (ref 0–0.07)
WBC # BLD AUTO: 4.24 THOUSAND/UL (ref 4.31–10.16)

## 2021-01-05 PROCEDURE — 94644 CONT INHLJ TX 1ST HOUR: CPT

## 2021-01-05 PROCEDURE — 99284 EMERGENCY DEPT VISIT MOD MDM: CPT

## 2021-01-05 PROCEDURE — 94760 N-INVAS EAR/PLS OXIMETRY 1: CPT

## 2021-01-05 PROCEDURE — 80053 COMPREHEN METABOLIC PANEL: CPT | Performed by: EMERGENCY MEDICINE

## 2021-01-05 PROCEDURE — 71045 X-RAY EXAM CHEST 1 VIEW: CPT

## 2021-01-05 PROCEDURE — U0003 INFECTIOUS AGENT DETECTION BY NUCLEIC ACID (DNA OR RNA); SEVERE ACUTE RESPIRATORY SYNDROME CORONAVIRUS 2 (SARS-COV-2) (CORONAVIRUS DISEASE [COVID-19]), AMPLIFIED PROBE TECHNIQUE, MAKING USE OF HIGH THROUGHPUT TECHNOLOGIES AS DESCRIBED BY CMS-2020-01-R: HCPCS | Performed by: EMERGENCY MEDICINE

## 2021-01-05 PROCEDURE — 36415 COLL VENOUS BLD VENIPUNCTURE: CPT | Performed by: EMERGENCY MEDICINE

## 2021-01-05 PROCEDURE — 94664 DEMO&/EVAL PT USE INHALER: CPT

## 2021-01-05 PROCEDURE — 96372 THER/PROPH/DIAG INJ SC/IM: CPT

## 2021-01-05 PROCEDURE — 93005 ELECTROCARDIOGRAM TRACING: CPT

## 2021-01-05 PROCEDURE — 84484 ASSAY OF TROPONIN QUANT: CPT | Performed by: EMERGENCY MEDICINE

## 2021-01-05 PROCEDURE — 94640 AIRWAY INHALATION TREATMENT: CPT

## 2021-01-05 PROCEDURE — 99285 EMERGENCY DEPT VISIT HI MDM: CPT | Performed by: EMERGENCY MEDICINE

## 2021-01-05 PROCEDURE — 85025 COMPLETE CBC W/AUTO DIFF WBC: CPT | Performed by: EMERGENCY MEDICINE

## 2021-01-05 RX ORDER — AZITHROMYCIN 250 MG/1
500 TABLET, FILM COATED ORAL ONCE
Status: COMPLETED | OUTPATIENT
Start: 2021-01-05 | End: 2021-01-05

## 2021-01-05 RX ORDER — PREDNISONE 20 MG/1
20 TABLET ORAL DAILY
Qty: 15 TABLET | Refills: 0 | Status: SHIPPED | OUTPATIENT
Start: 2021-01-05

## 2021-01-05 RX ORDER — ALBUTEROL SULFATE 90 UG/1
1-2 AEROSOL, METERED RESPIRATORY (INHALATION) EVERY 6 HOURS PRN
Qty: 1 INHALER | Refills: 0 | Status: SHIPPED | OUTPATIENT
Start: 2021-01-05

## 2021-01-05 RX ORDER — METHYLPREDNISOLONE ACETATE 80 MG/ML
60 INJECTION, SUSPENSION INTRA-ARTICULAR; INTRALESIONAL; INTRAMUSCULAR; SOFT TISSUE ONCE
Status: COMPLETED | OUTPATIENT
Start: 2021-01-05 | End: 2021-01-05

## 2021-01-05 RX ORDER — AZITHROMYCIN 250 MG/1
250 TABLET, FILM COATED ORAL EVERY 24 HOURS
Qty: 4 TABLET | Refills: 0 | Status: SHIPPED | OUTPATIENT
Start: 2021-01-05 | End: 2021-01-09

## 2021-01-05 RX ORDER — SODIUM CHLORIDE FOR INHALATION 0.9 %
3 VIAL, NEBULIZER (ML) INHALATION ONCE
Status: COMPLETED | OUTPATIENT
Start: 2021-01-05 | End: 2021-01-05

## 2021-01-05 RX ADMIN — IPRATROPIUM BROMIDE 1 MG: 0.5 SOLUTION RESPIRATORY (INHALATION) at 22:13

## 2021-01-05 RX ADMIN — ISODIUM CHLORIDE 3 ML: 0.03 SOLUTION RESPIRATORY (INHALATION) at 22:13

## 2021-01-05 RX ADMIN — ALBUTEROL SULFATE 10 MG: 2.5 SOLUTION RESPIRATORY (INHALATION) at 22:13

## 2021-01-05 RX ADMIN — AZITHROMYCIN MONOHYDRATE 500 MG: 250 TABLET ORAL at 22:11

## 2021-01-05 RX ADMIN — METHYLPREDNISOLONE ACETATE 60 MG: 80 INJECTION, SUSPENSION INTRA-ARTICULAR; INTRALESIONAL; INTRAMUSCULAR; SOFT TISSUE at 22:11

## 2021-01-06 NOTE — ED PROCEDURE NOTE
PROCEDURE  ECG 12 Lead Documentation Only    Date/Time: 1/5/2021 7:45 PM  Performed by: Sridhar Moore MD  Authorized by:  Sridhar Moore MD     Indications / Diagnosis:  Shortness of breath  ECG reviewed by me, the ED Provider: yes    Patient location:  Other (comment)  Previous ECG:     Previous ECG:  Unavailable    Comparison to cardiac monitor: No    Interpretation:     Interpretation: normal    Rate:     ECG rate:  72    ECG rate assessment: normal    Rhythm:     Rhythm: sinus rhythm    Ectopy:     Ectopy: none    QRS:     QRS axis:  Normal    QRS intervals:  Normal  Conduction:     Conduction: normal    ST segments:     ST segments:  Normal  T waves:     T waves: normal    Comments:      No acute ischemia ir infarction         Sridhar Moore MD  01/05/21 4717

## 2021-01-06 NOTE — ED PROVIDER NOTES
History  Chief Complaint   Patient presents with    Asthma     Patient reports exacerbation of asthma today, used her inhaler but still complaining of chest pain      This is a 61-year-old female with history of asthma that presents emergency department with cough and wheezing  She denies fever chills  Unknown COVID contacts  Patient states for the past 2-3 days she has had increased work of breathing and dry cough  She has been using her albuterol inhaler as needed with last dose being over 24 hours ago  Patient states that she has a burning sensation on deep inspiration over her trachea  She denies chest pain  No alleviating or aggravating factors  She does complain of myalgias however  no fever or  chills  Has had hyssterectomy          Prior to Admission Medications   Prescriptions Last Dose Informant Patient Reported? Taking?    albuterol (PROVENTIL HFA,VENTOLIN HFA) 90 mcg/act inhaler   Yes No   Sig: Inhale 2 puffs every 6 (six) hours as needed for wheezing   budesonide-formoterol (SYMBICORT) 160-4 5 mcg/act inhaler   Yes No   Sig: Inhale 2 puffs 2 (two) times a day   cloNIDine (CATAPRES) 0 1 mg tablet   No No   Sig: Take 1 tablet (0 1 mg total) by mouth every 12 (twelve) hours for 90 days   hydrochlorothiazide (HYDRODIURIL) 25 mg tablet   No No   Sig: Take 1 tablet (25 mg total) by mouth daily   levothyroxine 75 mcg tablet   No No   Sig: Take 1 tablet (75 mcg total) by mouth daily in the early morning   montelukast (SINGULAIR) 10 mg tablet   No No   Sig: Take 1 tablet (10 mg total) by mouth daily at bedtime   potassium chloride (K-DUR,KLOR-CON) 10 mEq tablet   Yes No   Sig: Take 10 mEq by mouth daily     predniSONE 20 mg tablet   No No   Sig: 40 x 2 days then 30 x 3, 20 x 3 and stop   thiamine 100 MG tablet   Yes No   Sig: Take 100 mg by mouth daily   verapamil (CALAN-SR) 180 mg CR tablet   No No   Sig: Take 1 tablet (180 mg total) by mouth daily      Facility-Administered Medications: None Past Medical History:   Diagnosis Date    Asthma     Disease of thyroid gland     Hypertension        Past Surgical History:   Procedure Laterality Date    CARPAL TUNNEL RELEASE       SECTION      4 c section    HYSTERECTOMY      LAPAROSCOPIC GASTRIC BANDING      VOCAL CORD LATERALIZATION, ENDOSCOPIC APPROACH W/ MLB         Family History   Family history unknown: Yes     I have reviewed and agree with the history as documented  E-Cigarette/Vaping     E-Cigarette/Vaping Substances     Social History     Tobacco Use    Smoking status: Never Smoker    Smokeless tobacco: Never Used   Substance Use Topics    Alcohol use: No    Drug use: No       Review of Systems   Constitutional: Negative for activity change, appetite change, chills, diaphoresis, fatigue, fever and unexpected weight change  HENT: Negative for congestion, ear discharge, ear pain, mouth sores, sinus pressure, sinus pain, sneezing, sore throat, trouble swallowing and voice change  Eyes: Negative for photophobia, pain, discharge, redness, itching and visual disturbance  Respiratory: Positive for cough, shortness of breath and wheezing  Negative for chest tightness  Cardiovascular: Negative for chest pain, palpitations and leg swelling  Gastrointestinal: Negative for abdominal pain, constipation, nausea and vomiting  Endocrine: Negative for cold intolerance, heat intolerance, polydipsia, polyphagia and polyuria  Genitourinary: Negative for decreased urine volume, difficulty urinating, dysuria, frequency, hematuria and urgency  Musculoskeletal: Positive for myalgias  Negative for arthralgias, back pain, gait problem, joint swelling, neck pain and neck stiffness  Skin: Negative for color change and rash  Allergic/Immunologic: Negative for immunocompromised state  Neurological: Negative for dizziness, tremors, seizures, syncope, speech difficulty, weakness, light-headedness, numbness and headaches  Hematological: Does not bruise/bleed easily  Psychiatric/Behavioral: Negative for behavioral problems and suicidal ideas  Physical Exam  Physical Exam  Vitals signs and nursing note reviewed  Constitutional:       General: She is not in acute distress  Appearance: Normal appearance  She is well-developed and normal weight  She is not ill-appearing, toxic-appearing or diaphoretic  HENT:      Head: Normocephalic and atraumatic  Right Ear: Tympanic membrane, ear canal and external ear normal  There is no impacted cerumen  Left Ear: Tympanic membrane, ear canal and external ear normal  There is no impacted cerumen  Nose: Nose normal  No congestion or rhinorrhea  Mouth/Throat:      Mouth: Mucous membranes are moist       Pharynx: Oropharynx is clear  No oropharyngeal exudate or posterior oropharyngeal erythema  Eyes:      General: No scleral icterus  Right eye: No discharge  Left eye: No discharge  Extraocular Movements: Extraocular movements intact  Conjunctiva/sclera: Conjunctivae normal       Pupils: Pupils are equal, round, and reactive to light  Neck:      Musculoskeletal: Normal range of motion and neck supple  No neck rigidity or muscular tenderness  Thyroid: No thyromegaly  Vascular: No hepatojugular reflux or JVD  Trachea: No tracheal deviation  Cardiovascular:      Rate and Rhythm: Normal rate and regular rhythm  Pulses: Normal pulses  Carotid pulses are 2+ on the right side and 2+ on the left side  Radial pulses are 2+ on the right side and 2+ on the left side  Dorsalis pedis pulses are 2+ on the right side and 2+ on the left side  Posterior tibial pulses are 2+ on the right side and 2+ on the left side  Heart sounds: Normal heart sounds  No murmur  Pulmonary:      Effort: Pulmonary effort is normal  No accessory muscle usage or respiratory distress        Breath sounds: Wheezing present  No rales  Comments: Significantly decreased air movement bilaterally  Chest:      Chest wall: No mass, deformity, tenderness, crepitus or edema  Abdominal:      General: Bowel sounds are normal  There is no distension or abdominal bruit  Palpations: Abdomen is soft  There is no hepatomegaly, splenomegaly or mass  Tenderness: There is no abdominal tenderness  There is no right CVA tenderness, left CVA tenderness, guarding or rebound  Hernia: No hernia is present  Musculoskeletal: Normal range of motion  General: No tenderness  Right lower leg: She exhibits no tenderness  No edema  Left lower leg: She exhibits no tenderness  No edema  Lymphadenopathy:      Cervical: No cervical adenopathy  Skin:     General: Skin is warm and dry  Capillary Refill: Capillary refill takes less than 2 seconds  Findings: No ecchymosis or rash  Neurological:      General: No focal deficit present  Mental Status: She is alert and oriented to person, place, and time  Cranial Nerves: No cranial nerve deficit  Sensory: No sensory deficit  Motor: No weakness or abnormal muscle tone  Deep Tendon Reflexes: Reflexes normal    Psychiatric:         Mood and Affect: Mood normal          Behavior: Behavior normal          Thought Content:  Thought content normal          Judgment: Judgment normal          Vital Signs  ED Triage Vitals [01/05/21 2014]   Temperature Pulse Respirations Blood Pressure SpO2   98 °F (36 7 °C) 80 18 126/83 100 %      Temp Source Heart Rate Source Patient Position - Orthostatic VS BP Location FiO2 (%)   Oral Monitor Lying Right arm --      Pain Score       4           Vitals:    01/05/21 2014 01/05/21 2323   BP: 126/83 130/87   Pulse: 80 98   Patient Position - Orthostatic VS: Lying Sitting         Visual Acuity      ED Medications  Medications   albuterol inhalation solution 10 mg (10 mg Nebulization Given 1/5/21 2213)     And ipratropium (ATROVENT) 0 02 % inhalation solution 1 mg (1 mg Nebulization Given 1/5/21 2213)     And   sodium chloride 0 9 % inhalation solution 3 mL (3 mL Nebulization Given 1/5/21 2213)   methylPREDNISolone acetate (DEPO-MEDROL) injection 60 mg (60 mg Intramuscular Given 1/5/21 2211)   azithromycin (ZITHROMAX) tablet 500 mg (500 mg Oral Given 1/5/21 2211)       Diagnostic Studies  Results Reviewed     Procedure Component Value Units Date/Time    Novel Coronavirus (COVID-19), PCR [facility] [[id number]] Collected: 01/05/21 2209    Lab Status:  In process Specimen: Nasopharyngeal Swab Updated: 01/05/21 2217    Comprehensive metabolic panel [[id number]] Collected: 01/05/21 2016    Lab Status: Final result Specimen: Blood from Arm, Left Updated: 01/05/21 2111     Sodium 137 mmol/L      Potassium 4 1 mmol/L      Chloride 106 mmol/L      CO2 23 mmol/L      ANION GAP 8 mmol/L      BUN 11 mg/dL      Creatinine 0 96 mg/dL      Glucose 93 mg/dL      Calcium 8 9 mg/dL      AST 25 U/L      ALT 29 U/L      Alkaline Phosphatase 97 4 U/L      Total Protein 7 7 g/dL      Albumin 4 0 g/dL      Total Bilirubin 0 30 mg/dL      eGFR 74 ml/min/1 73sq m     Narrative:      [place] guidelines for Chronic Kidney Disease (CKD):     Stage 1 with normal or high GFR (GFR > 90 mL/min/1 73 square meters)    Stage 2 Mild CKD (GFR = 60-89 mL/min/1 73 square meters)    Stage 3A Moderate CKD (GFR = 45-59 mL/min/1 73 square meters)    Stage 3B Moderate CKD (GFR = 30-44 mL/min/1 73 square meters)    Stage 4 Severe CKD (GFR = 15-29 mL/min/1 73 square meters)    Stage 5 End Stage CKD (GFR <15 mL/min/1 73 square meters)  Note: GFR calculation is accurate only with a steady state creatinine    CBC and differential [[id number]]  (Abnormal) Collected: 01/05/21 2016    Lab Status: Final result Specimen: Blood from Arm, Left Updated: 01/05/21 2042     WBC 4 24 Thousand/uL      RBC 4 78 Million/uL      Hemoglobin 13 2 g/dL Hematocrit 39 7 %      MCV 83 fL      MCH 27 6 pg      MCHC 33 2 g/dL      RDW 13 8 %      MPV 11 3 fL      Platelets 356 Thousands/uL      Neutrophils Relative 45 %      Immat GRANS % 0 %      Lymphocytes Relative 43 %      Monocytes Relative 10 %      Eosinophils Relative 1 %      Basophils Relative 1 %      Neutrophils Absolute 1 90 Thousands/µL      Immature Grans Absolute 0 00 Thousand/uL      Lymphocytes Absolute 1 80 Thousands/µL      Monocytes Absolute 0 44 Thousand/µL      Eosinophils Absolute 0 05 Thousand/µL      Basophils Absolute 0 05 Thousands/µL     Narrative: This is an appended report  These results have been appended to a previously verified report  Troponin I [[id number]]  (Normal) Collected: 01/05/21 2016    Lab Status: Final result Specimen: Blood from Arm, Left Updated: 01/05/21 2042     Troponin I <0 03 ng/mL                  XR chest 1 view portable   ED Interpretation by Irena Maldonado MD (01/05 2249)   NO ACUTE FINDINGS                 Procedures  Procedures         ED Course  ED Course as of Jan 06 0617 Wed Jan 06, 2021   [id number] This 36year old female with asthma presented to the ED with acute asthma exacerbation  Wheezing and decreased bilateral air movement on arrival - treated with hour long neb treatment and steroid injection with significant relief of symptoms  Trop normal  COVID pending  Slight leukopenia at 4 24  Otherwise CBC unremarkable CMP unremarkable  CXR appears as early bronchitis - started on azith  Discharged on azith, steroid burst and albuterol with instructions to take 2 puffs every 4-6 hours around the clock for the next 72 hours and then on an as needed basis  Patient was reexamined at this time and informed of laboratory and/or imaging results and was found to be stable for discharge  Return to emergency department criteria was reviewed with the patient who verbalized understanding and was agreeable to discharge and the treatment plan at this time  All imaging and/or lab testing discussed with patient, strict return to ED precautions discussed  Patient recommended to follow up promptly with appropriate outpatient provider  Patient and/or family members verbalizes understanding and agrees with plan  Patient is stable for discharge      Portions of the record may have been created with voice recognition software  Occasional wrong word or "sound a like" substitutions may have occurred due to the inherent limitations of voice recognition software  Read the chart carefully and recognize, using context, where substitutions have occurred                                                   MDM  Number of Diagnoses or Management Options  Asthma exacerbation: new and requires workup  Bronchitis: new and requires workup  Encounter for laboratory testing for COVID-19 virus: new and requires workup  Diagnosis management comments: Concerned for asthma exac, PNA,COVID,flu,RSV, viral URI, bacterial bronchitis       Amount and/or Complexity of Data Reviewed  Clinical lab tests: ordered and reviewed  Tests in the radiology section of CPT®: ordered and reviewed  Independent visualization of images, tracings, or specimens: yes    Risk of Complications, Morbidity, and/or Mortality  Presenting problems: high  Diagnostic procedures: moderate  Management options: moderate    Patient Progress  Patient progress: improved      Disposition  Final diagnoses:   Asthma exacerbation   Bronchitis   Encounter for laboratory testing for COVID-19 virus     Time reflects when diagnosis was documented in both MDM as applicable and the Disposition within this note     Time User Action Codes Description Comment    1/5/2021 10:47 PM Mee Hernandez [R08 [id number]] Asthma exacerbation     1/5/2021 10:47 PM Mee Hernandez [[id number]] Bronchitis     1/5/2021 10:48 PM Mee Hernnadez [[id number]] Encounter for laboratory testing for COVID-19 virus       ED Disposition     ED Disposition Condition Date/Time Comment    Discharge Stable Tue Jan 5, 2021 10:47 PM Ramona Pritchard discharge to home/self care  Follow-up Information     Follow up With Specialties Details Why Unruly Valles MD Internal Medicine Schedule an appointment as soon as possible for a visit in 3 days  Attn: THANG Arechiga   [facility] [address]  [address] [id number]            Discharge Medication List as of 1/5/2021 11:13 PM      START taking these medications    Details   !! albuterol (PROVENTIL HFA,VENTOLIN HFA) 90 mcg/act inhaler Inhale 1-2 puffs every 6 (six) hours as needed for wheezing, Starting Tue 1/5/2021, Normal      azithromycin (ZITHROMAX) 250 mg tablet Take 1 tablet (250 mg total) by mouth every 24 hours for 4 days Take one tablet daily for 4 days starting on 1/6/2020, Starting Tue 1/5/2021, Until Sat 1/9/2021, Normal      !! predniSONE 20 mg tablet Take 1 tablet (20 mg total) by mouth daily, Starting Tue 1/5/2021, Normal       !! - Potential duplicate medications found  Please discuss with provider        CONTINUE these medications which have NOT CHANGED    Details   !! albuterol (PROVENTIL HFA,VENTOLIN HFA) 90 mcg/act inhaler Inhale 2 puffs every 6 (six) hours as needed for wheezing, Until Discontinued, Historical Med      budesonide-formoterol (SYMBICORT) 160-4 5 mcg/act inhaler Inhale 2 puffs 2 (two) times a day, Until Discontinued, Historical Med      cloNIDine (CATAPRES) 0 1 mg tablet Take 1 tablet (0 1 mg total) by mouth every 12 (twelve) hours for 90 days, Starting Fri 12/14/2018, Until Thu 3/14/2019, Normal      hydrochlorothiazide (HYDRODIURIL) 25 mg tablet Take 1 tablet (25 mg total) by mouth daily, Starting Fri 12/14/2018, Normal      levothyroxine 75 mcg tablet Take 1 tablet (75 mcg total) by mouth daily in the early morning, Starting Sat 12/15/2018, Normal      montelukast (SINGULAIR) 10 mg tablet Take 1 tablet (10 mg total) by mouth daily at bedtime, Starting Fri 12/14/2018, Normal      potassium chloride (K-DUR,KLOR-CON) 10 mEq tablet Take 10 mEq by mouth daily  , Until Discontinued, Historical Med      !! predniSONE 20 mg tablet 40 x 2 days then 30 x 3, 20 x 3 and stop, Normal      thiamine 100 MG tablet Take 100 mg by mouth daily, Until Discontinued, Historical Med      verapamil (CALAN-SR) 180 mg CR tablet Take 1 tablet (180 mg total) by mouth daily, Starting Fri 12/14/2018, Normal       !! - Potential duplicate medications found  Please discuss with provider  No discharge procedures on file      PDMP Review     None          ED Provider  Electronically Signed by           Dipti Zhong MD  01/06/21 3549

## 2021-01-06 NOTE — ED NOTES
Patient verbalized understanding of discharge instructions and new medication education      Christian Israel RN  01/05/21 4996

## 2021-01-06 NOTE — DISCHARGE INSTRUCTIONS
We have tested you for COVID - the test is an outpatient test and will be back in  2-3 days - you will receive a call with the results in the next 2-3 days    Take 2 puffs of your albuterol inhaler every 4-6 hours around the clock for the next 72 hours - then on an as needed basis    I recommended continued isolation until at least 24 hours have passed since recovery defined as resolution of fever without the use of fever-reducing medications and improvement in respiratory symptoms (e g , cough, shortness of breath) AND 10 days have passed since onset of symptoms  [address]     Your healthcare provider and/or public health staff have evaluated you and have determined that you do not need to remain in the hospital at this time  At this time you can be isolated at home where you will be monitored by staff from your local or state health department  You should carefully follow the prevention and isolation steps below until a healthcare provider or local or state health department says that you can return to your normal activities  Stay home except to get medical care     People who are mildly ill with COVID-19 are able to isolate at home during their illness  You should restrict activities outside your home, except for getting medical care  Do not go to work, school, or public areas  Avoid using public transportation, ride-sharing, or taxis  Separate yourself from other people and animals in your home     People: As much as possible, you should stay in a specific room and away from other people in your home  Also, you should use a separate bathroom, if available  Animals: You should restrict contact with pets and other animals while you are sick with COVID-19, just like you would around other people   Although there have not been reports of pets or other animals becoming sick with COVID-19, it is still recommended that people sick with COVID-19 limit contact with animals until more information is known about the virus  When possible, have another member of your household care for your animals while you are sick  If you are sick with COVID-19, avoid contact with your pet, including petting, snuggling, being kissed or licked, and sharing food  If you must care for your pet or be around animals while you are sick, wash your hands before and after you interact with pets and wear a facemask  See COVID-19 and Animals for more information  Call ahead before visiting your doctor     If you have a medical appointment, call the healthcare provider and tell them that you have or may have COVID-19  This will help the healthcare providers office take steps to keep other people from getting infected or exposed  Wear a facemask     You should wear a facemask when you are around other people (e g , sharing a room or vehicle) or pets and before you enter a healthcare providers office  If you are not able to wear a facemask (for example, because it causes trouble breathing), then people who live with you should not stay in the same room with you, or they should wear a facemask if they enter your room  Cover your coughs and sneezes     Cover your mouth and nose with a tissue when you cough or sneeze  Throw used tissues in a lined trash can  Immediately wash your hands with soap and water for at least 20 seconds or, if soap and water are not available, clean your hands with an alcohol-based hand  that contains at least 60% alcohol  Clean your hands often     Wash your hands often with soap and water for at least 20 seconds, especially after blowing your nose, coughing, or sneezing; going to the bathroom; and before eating or preparing food  If soap and water are not readily available, use an alcohol-based hand  with at least 60% alcohol, covering all surfaces of your hands and rubbing them together until they feel dry    Soap and water are the best option if hands are visibly dirty  Avoid touching your eyes, nose, and mouth with unwashed hands  Avoid sharing personal household items     You should not share dishes, drinking glasses, cups, eating utensils, towels, or bedding with other people or pets in your home  After using these items, they should be washed thoroughly with soap and water  Clean all high-touch surfaces everyday     High touch surfaces include counters, tabletops, doorknobs, bathroom fixtures, toilets, phones, keyboards, tablets, and bedside tables  Also, clean any surfaces that may have blood, stool, or body fluids on them  Use a household cleaning spray or wipe, according to the label instructions  Labels contain instructions for safe and effective use of the cleaning product including precautions you should take when applying the product, such as wearing gloves and making sure you have good ventilation during use of the product  Monitor your symptoms     Seek prompt medical attention if your illness is worsening (e g , difficulty breathing)  Before seeking care, call your healthcare provider and tell them that you have, or are being evaluated for, COVID-19  Put on a facemask before you enter the facility  These steps will help the healthcare providers office to keep other people in the office or waiting room from getting infected or exposed  Ask your healthcare provider to call the local or state health department  Persons who are placed under active monitoring or facilitated self-monitoring should follow instructions provided by their local health department or occupational health professionals, as appropriate  If you have a medical emergency and need to call 911, notify the dispatch personnel that you have, or are being evaluated for COVID-19  If possible, put on a facemask before emergency medical services arrive       Discontinuing home isolation     Patients with confirmed COVID-19 should remain under home isolation precautions until the following conditions are met: They have had no fever for at least 24 hours (that is one full day of no fever without the use medicine that reduces fevers)  AND  other symptoms have improved (for example, when their cough or shortness of breath have improved)  AND  If had mild or moderate illness, at least 10 days have passed since their symptoms first appeared or if severe illness (needed oxygen) or immunosuppressed, at least 20 days have passed since symptoms first appeared  Patients with confirmed COVID-19 should also notify close contacts (including their workplace) and ask that they self-quarantine  Currently, close contact is defined as being within 6 feet for 15 minutes or more from the period 24 hours starting 48 hours before symptom onset to the time at which the patient went into isolation  Close contacts of patients diagnosed with COVID-19 should be instructed by the patient to self-quarantine for 14 days from the last time of their last contact with the patient        Source: [employer]

## 2021-01-07 LAB — SARS-COV-2 RNA SPEC QL NAA+PROBE: DETECTED

## 2021-01-07 NOTE — RESULT ENCOUNTER NOTE
Pt informed of positive covid results  Advised to quarantine   Pt actually on her way back to the hospital to bring her daughter for evaluation

## 2021-01-08 LAB
ATRIAL RATE: 72 BPM
P AXIS: 44 DEGREES
PR INTERVAL: 139 MS
QRS AXIS: 9 DEGREES
QRSD INTERVAL: 83 MS
QT INTERVAL: 423 MS
QTC INTERVAL: 463 MS
T WAVE AXIS: 12 DEGREES
VENTRICULAR RATE: 72 BPM

## 2021-01-08 PROCEDURE — 93010 ELECTROCARDIOGRAM REPORT: CPT | Performed by: INTERNAL MEDICINE

## 2022-06-05 ENCOUNTER — HOSPITAL ENCOUNTER (EMERGENCY)
Facility: HOSPITAL | Age: 42
Discharge: HOME | End: 2022-06-06
Attending: EMERGENCY MEDICINE | Admitting: EMERGENCY MEDICINE
Payer: COMMERCIAL

## 2022-06-05 VITALS
DIASTOLIC BLOOD PRESSURE: 89 MMHG | TEMPERATURE: 99.7 F | OXYGEN SATURATION: 98 % | SYSTOLIC BLOOD PRESSURE: 142 MMHG | RESPIRATION RATE: 20 BRPM | HEART RATE: 103 BPM

## 2022-06-05 DIAGNOSIS — J02.9 PHARYNGITIS, UNSPECIFIED ETIOLOGY: Primary | ICD-10-CM

## 2022-06-05 LAB
ALBUMIN SERPL BCP-MCNC: 3.5 G/DL (ref 3.5–5)
ALP SERPL-CCNC: 116 U/L (ref 46–116)
ALT SERPL W P-5'-P-CCNC: 37 U/L (ref 12–78)
ANION GAP SERPL CALCULATED.3IONS-SCNC: 11 MMOL/L (ref 4–13)
AST SERPL W P-5'-P-CCNC: 21 U/L (ref 5–45)
BASOPHILS # BLD AUTO: 0.04 THOUSANDS/ΜL (ref 0–0.1)
BASOPHILS NFR BLD AUTO: 0 % (ref 0–1)
BILIRUB SERPL-MCNC: 0.48 MG/DL (ref 0.2–1)
BUN SERPL-MCNC: 16 MG/DL (ref 5–25)
CALCIUM SERPL-MCNC: 8.8 MG/DL (ref 8.3–10.1)
CHLORIDE SERPL-SCNC: 99 MMOL/L (ref 100–108)
CO2 SERPL-SCNC: 24 MMOL/L (ref 21–32)
CREAT SERPL-MCNC: 1.1 MG/DL (ref 0.6–1.3)
EOSINOPHIL # BLD AUTO: 0.01 THOUSAND/ΜL (ref 0–0.61)
EOSINOPHIL NFR BLD AUTO: 0 % (ref 0–6)
ERYTHROCYTE [DISTWIDTH] IN BLOOD BY AUTOMATED COUNT: 13.4 % (ref 11.6–15.1)
FLUAV RNA RESP QL NAA+PROBE: NEGATIVE
FLUBV RNA RESP QL NAA+PROBE: NEGATIVE
GFR SERPL CREATININE-BSD FRML MDRD: 62 ML/MIN/1.73SQ M
GLUCOSE SERPL-MCNC: 111 MG/DL (ref 65–140)
HCG SERPL QL: NEGATIVE
HCT VFR BLD AUTO: 43.5 % (ref 34.8–46.1)
HGB BLD-MCNC: 14.7 G/DL (ref 11.5–15.4)
IMM GRANULOCYTES # BLD AUTO: 0.03 THOUSAND/UL (ref 0–0.2)
IMM GRANULOCYTES NFR BLD AUTO: 0 % (ref 0–2)
LYMPHOCYTES # BLD AUTO: 2.16 THOUSANDS/ΜL (ref 0.6–4.47)
LYMPHOCYTES NFR BLD AUTO: 20 % (ref 14–44)
MCH RBC QN AUTO: 29 PG (ref 26.8–34.3)
MCHC RBC AUTO-ENTMCNC: 33.8 G/DL (ref 31.4–37.4)
MCV RBC AUTO: 86 FL (ref 82–98)
MONOCYTES # BLD AUTO: 0.91 THOUSAND/ΜL (ref 0.17–1.22)
MONOCYTES NFR BLD AUTO: 8 % (ref 4–12)
NEUTROPHILS # BLD AUTO: 7.62 THOUSANDS/ΜL (ref 1.85–7.62)
NEUTS SEG NFR BLD AUTO: 72 % (ref 43–75)
NRBC BLD AUTO-RTO: 0 /100 WBCS
PLATELET # BLD AUTO: 263 THOUSANDS/UL (ref 149–390)
PMV BLD AUTO: 11.3 FL (ref 8.9–12.7)
POTASSIUM SERPL-SCNC: 3 MMOL/L (ref 3.5–5.3)
PROT SERPL-MCNC: 8.3 G/DL (ref 6.4–8.2)
RBC # BLD AUTO: 5.07 MILLION/UL (ref 3.81–5.12)
RSV RNA RESP QL NAA+PROBE: NEGATIVE
S PYO DNA THROAT QL NAA+PROBE: NOT DETECTED
SARS-COV-2 RNA RESP QL NAA+PROBE: NEGATIVE
SODIUM SERPL-SCNC: 134 MMOL/L (ref 136–145)
WBC # BLD AUTO: 10.77 THOUSAND/UL (ref 4.31–10.16)

## 2022-06-05 PROCEDURE — 99285 EMERGENCY DEPT VISIT HI MDM: CPT | Performed by: EMERGENCY MEDICINE

## 2022-06-05 PROCEDURE — 80053 COMPREHEN METABOLIC PANEL: CPT | Performed by: EMERGENCY MEDICINE

## 2022-06-05 PROCEDURE — 93005 ELECTROCARDIOGRAM TRACING: CPT

## 2022-06-05 PROCEDURE — 99284 EMERGENCY DEPT VISIT MOD MDM: CPT

## 2022-06-05 PROCEDURE — 87651 STREP A DNA AMP PROBE: CPT | Performed by: EMERGENCY MEDICINE

## 2022-06-05 PROCEDURE — 96360 HYDRATION IV INFUSION INIT: CPT

## 2022-06-05 PROCEDURE — 96372 THER/PROPH/DIAG INJ SC/IM: CPT

## 2022-06-05 PROCEDURE — 85025 COMPLETE CBC W/AUTO DIFF WBC: CPT | Performed by: EMERGENCY MEDICINE

## 2022-06-05 PROCEDURE — 36415 COLL VENOUS BLD VENIPUNCTURE: CPT | Performed by: EMERGENCY MEDICINE

## 2022-06-05 PROCEDURE — 0241U HB NFCT DS VIR RESP RNA 4 TRGT: CPT | Performed by: EMERGENCY MEDICINE

## 2022-06-05 PROCEDURE — 84703 CHORIONIC GONADOTROPIN ASSAY: CPT | Performed by: EMERGENCY MEDICINE

## 2022-06-05 RX ORDER — KETOROLAC TROMETHAMINE 30 MG/ML
15 INJECTION, SOLUTION INTRAMUSCULAR; INTRAVENOUS ONCE
Status: COMPLETED | OUTPATIENT
Start: 2022-06-05 | End: 2022-06-05

## 2022-06-05 RX ORDER — POTASSIUM CHLORIDE 20 MEQ/1
40 TABLET, EXTENDED RELEASE ORAL ONCE
Status: COMPLETED | OUTPATIENT
Start: 2022-06-05 | End: 2022-06-05

## 2022-06-05 RX ORDER — DEXAMETHASONE 4 MG/1
10 TABLET ORAL ONCE
Status: COMPLETED | OUTPATIENT
Start: 2022-06-05 | End: 2022-06-05

## 2022-06-05 RX ADMIN — SODIUM CHLORIDE 1000 ML: 0.9 INJECTION, SOLUTION INTRAVENOUS at 23:08

## 2022-06-05 RX ADMIN — POTASSIUM CHLORIDE 40 MEQ: 1500 TABLET, EXTENDED RELEASE ORAL at 23:38

## 2022-06-05 RX ADMIN — KETOROLAC TROMETHAMINE 15 MG: 30 INJECTION, SOLUTION INTRAMUSCULAR at 22:08

## 2022-06-05 RX ADMIN — DEXAMETHASONE 10 MG: 4 TABLET ORAL at 22:07

## 2022-06-06 LAB
ATRIAL RATE: 117 BPM
P AXIS: 64 DEGREES
PR INTERVAL: 132 MS
QRS AXIS: 20 DEGREES
QRSD INTERVAL: 70 MS
QT INTERVAL: 348 MS
QTC INTERVAL: 485 MS
T WAVE AXIS: 23 DEGREES
VENTRICULAR RATE: 117 BPM

## 2022-06-06 PROCEDURE — 93010 ELECTROCARDIOGRAM REPORT: CPT | Performed by: INTERNAL MEDICINE

## 2022-06-06 NOTE — DISCHARGE INSTRUCTIONS
Take tylenol and ibuprofen as needed  Drink plenty of fluids to stay hydrated  Follow up with your family doctor  Return to the emergency department for any new or worsening symptoms

## 2022-06-06 NOTE — ED PROVIDER NOTES
Pt Name: hCasity Devi  MRN: [id number]  [place] 1980  Age/Sex: 43 y o  female  Date of evaluation: 2022  PCP: Vinod Diallo MD    [address] [place] [address]    Chief Complaint   Patient presents with    Flu Symptoms     Pt arrives ambulatory with a c/o sore throat and fever, cough and painful swallowing  HPI and MDM    43 y o  female presenting with sore throat and fever that started yesterday  Has two family contacts that are being treated for strep throat  Mentions Tmax of 104 9F at home  Has muscle aches  No cough or sob  No chest pain  No urinary sx  No covid exposures  EKG shows sinus tachycardia heart rate 117, narrow QRS, slightly prolonged QTC, NV interval reassuring, no ST elevation, no significant ST depression  Tachycardia likely due to dehydration, obtain blood work, reveals mild hypokalemia, she was given supplemental repletion  COVID, flu, RSV is negative  Strep is also negative  Not concerned for PTA  No hoarseness or stridor  Patient does not appear toxic  She is tolerating p o  Upon re-evaluation is feeling better  Heart rate did improve with IV fluids  Advised supportive care at home  PCP follow-up  Return precautions discussed                Medications   dexamethasone (DECADRON) tablet 10 mg (10 mg Oral Given 22)   ketorolac (TORADOL) injection 15 mg (15 mg Intramuscular Given 22)   sodium chloride 0 9 % bolus 1,000 mL (0 mL Intravenous Stopped 22 0004)   potassium chloride (K-DUR,KLOR-CON) CR tablet 40 mEq (40 mEq Oral Given 228)         Past Medical and Surgical History    Past Medical History:   Diagnosis Date    Asthma     Disease of thyroid gland     Hypertension        Past Surgical History:   Procedure Laterality Date    CARPAL TUNNEL RELEASE       SECTION      4 c section    HYSTERECTOMY      LAPAROSCOPIC GASTRIC BANDING      VOCAL CORD LATERALIZATION, ENDOSCOPIC APPROACH W/ MLB         Family History   Family history unknown: Yes       Social History     Tobacco Use    Smoking status: Never Smoker    Smokeless tobacco: Never Used   Vaping Use    Vaping Use: Never used   Substance Use Topics    Alcohol use: No    Drug use: No           Allergies    Allergies   Allergen Reactions    Oxycodone Anaphylaxis    Lisinopril        Home Medications    Prior to Admission medications    Medication Sig Start Date End Date Taking?  Authorizing Provider   albuterol (PROVENTIL HFA,VENTOLIN HFA) 90 mcg/act inhaler Inhale 2 puffs every 6 (six) hours as needed for wheezing    Historical Provider, MD   albuterol (PROVENTIL HFA,VENTOLIN HFA) 90 mcg/act inhaler Inhale 1-2 puffs every 6 (six) hours as needed for wheezing 1/5/21   Jacob Rios MD   budesonide-formoterol [facility]) 160-4 5 mcg/act inhaler Inhale 2 puffs 2 (two) times a day    Historical Provider, MD   cloNIDine (CATAPRES) 0 1 mg tablet Take 1 tablet (0 1 mg total) by mouth every 12 (twelve) hours for 90 days 12/14/18 3/14/19  Anirudh Meier PA-C   hydrochlorothiazide (HYDRODIURIL) 25 mg tablet Take 1 tablet (25 mg total) by mouth daily 12/14/18   Princess May PA-C   levothyroxine 75 mcg tablet Take 1 tablet (75 mcg total) by mouth daily in the early morning 12/15/18   Tim Meier PA-C   montelukast (SINGULAIR) 10 mg tablet Take 1 tablet (10 mg total) by mouth daily at bedtime 12/14/18   Anirudh Meier PA-C   potassium chloride (K-DUR,KLOR-CON) 10 mEq tablet Take 10 mEq by mouth daily      Historical Provider, MD   predniSONE 20 mg tablet 40 x 2 days then 30 x 3, 20 x 3 and stop 12/14/18   Anirudh Meier PA-C   predniSONE 20 mg tablet Take 1 tablet (20 mg total) by mouth daily 1/5/21   Jacob Rios MD   thiamine 100 MG tablet Take 100 mg by mouth daily    Historical Provider, MD   verapamil (CALAN-SR) 180 mg CR tablet Take 1 tablet (180 mg total) by mouth daily 12/14/18   Princess May PA-C           Review of Systems    Review of Systems Constitutional: Positive for fever  Negative for chills  HENT: Negative for rhinorrhea and sore throat  Eyes: Negative for pain and visual disturbance  Respiratory: Positive for cough  Negative for shortness of breath  Cardiovascular: Negative for chest pain and leg swelling  Gastrointestinal: Negative for abdominal pain, nausea and vomiting  Genitourinary: Negative for dysuria and hematuria  Musculoskeletal: Positive for myalgias  Negative for joint swelling  Skin: Negative for rash and wound  Neurological: Negative for syncope and headaches  Physical Exam      ED Triage Vitals   Temperature Pulse Respirations Blood Pressure SpO2   06/05/22 2111 06/05/22 2111 06/05/22 2111 06/05/22 2111 06/05/22 2111   99 7 °F (37 6 °C) (!) 139 21 139/94 99 %      Temp Source Heart Rate Source Patient Position - Orthostatic VS BP Location FiO2 (%)   06/05/22 2111 06/05/22 2111 06/05/22 2111 06/05/22 2111 --   Oral Monitor Sitting Left arm       Pain Score       06/05/22 2208       9               Physical Exam  Constitutional:       General: She is not in acute distress  Appearance: She is not ill-appearing  HENT:      Head: Normocephalic and atraumatic  Nose: Nose normal       Mouth/Throat:      Mouth: Mucous membranes are dry  Comments: Mild oropharyngeal erythema, no exudates, no uvular deviation, floor of mouth not elevated, she is toleration oral secretions  Eyes:      Extraocular Movements: Extraocular movements intact  Pupils: Pupils are equal, round, and reactive to light  Cardiovascular:      Rate and Rhythm: Normal rate and regular rhythm  Pulmonary:      Effort: No respiratory distress  Breath sounds: Normal breath sounds  No wheezing  Abdominal:      General: There is no distension  Tenderness: There is no abdominal tenderness  Musculoskeletal:         General: No swelling or deformity  Normal range of motion        Cervical back: Normal range of motion and neck supple  Skin:     General: Skin is warm  Findings: No erythema  Neurological:      Mental Status: She is alert and oriented to person, place, and time  Mental status is at baseline                Diagnostic Results      Labs:    Results Reviewed     Procedure Component Value Units Date/Time    hCG, qualitative pregnancy [[id number]]  (Normal) Collected: 06/05/22 2306    Lab Status: Final result Specimen: Blood from Arm, Right Updated: 06/05/22 2334     Preg, Serum Negative    Comprehensive metabolic panel [[id number]]  (Abnormal) Collected: 06/05/22 2306    Lab Status: Final result Specimen: Blood from Arm, Right Updated: 06/05/22 2328     Sodium 134 mmol/L      Potassium 3 0 mmol/L      Chloride 99 mmol/L      CO2 24 mmol/L      ANION GAP 11 mmol/L      BUN 16 mg/dL      Creatinine 1 10 mg/dL      Glucose 111 mg/dL      Calcium 8 8 mg/dL      AST 21 U/L      ALT 37 U/L      Alkaline Phosphatase 116 U/L      Total Protein 8 3 g/dL      Albumin 3 5 g/dL      Total Bilirubin 0 48 mg/dL      eGFR 62 ml/min/1 73sq m     Narrative:      Sanam guidelines for Chronic Kidney Disease (CKD):     Stage 1 with normal or high GFR (GFR > 90 mL/min/1 73 square meters)    Stage 2 Mild CKD (GFR = 60-89 mL/min/1 73 square meters)    Stage 3A Moderate CKD (GFR = 45-59 mL/min/1 73 square meters)    Stage 3B Moderate CKD (GFR = 30-44 mL/min/1 73 square meters)    Stage 4 Severe CKD (GFR = 15-29 mL/min/1 73 square meters)    Stage 5 End Stage CKD (GFR <15 mL/min/1 73 square meters)  Note: GFR calculation is accurate only with a steady state creatinine    CBC and differential [[id number]]  (Abnormal) Collected: 06/05/22 2306    Lab Status: Final result Specimen: Blood from Arm, Right Updated: 06/05/22 2313     WBC 10 77 Thousand/uL      RBC 5 07 Million/uL      Hemoglobin 14 7 g/dL      Hematocrit 43 5 %      MCV 86 fL      MCH 29 0 pg      MCHC 33 8 g/dL      RDW 13 4 %      MPV 11 3 fL      Platelets 357 Thousands/uL      nRBC 0 /100 WBCs      Neutrophils Relative 72 %      Immat GRANS % 0 %      Lymphocytes Relative 20 %      Monocytes Relative 8 %      Eosinophils Relative 0 %      Basophils Relative 0 %      Neutrophils Absolute 7 62 Thousands/µL      Immature Grans Absolute 0 03 Thousand/uL      Lymphocytes Absolute 2 16 Thousands/µL      Monocytes Absolute 0 91 Thousand/µL      Eosinophils Absolute 0 01 Thousand/µL      Basophils Absolute 0 04 Thousands/µL     COVID/FLU/RSV [[id number]]  (Normal) Collected: 06/05/22 2115    Lab Status: Final result Specimen: Nares from Nose Updated: 06/05/22 2200     SARS-CoV-2 Negative     INFLUENZA A PCR Negative     INFLUENZA B PCR Negative     RSV PCR Negative    Narrative:      FOR PEDIATRIC PATIENTS - copy/paste COVID Guidelines URL to browser: [email or web address]  [employer]    SARS-CoV-2 assay is a Nucleic Acid Amplification assay intended for the  qualitative detection of nucleic acid from SARS-CoV-2 in nasopharyngeal  swabs  Results are for the presumptive identification of SARS-CoV-2 RNA  Positive results are indicative of infection with SARS-CoV-2, the virus  causing COVID-19, but do not rule out bacterial infection or co-infection  with other viruses  Laboratories within the [place] and its  territories are required to report all positive results to the appropriate  public health authorities  Negative results do not preclude SARS-CoV-2  infection and should not be used as the sole basis for treatment or other  patient management decisions  Negative results must be combined with  clinical observations, patient history, and epidemiological information  This test has not been FDA cleared or approved  This test has been authorized by FDA under an Emergency Use Authorization  (EUA)   This test is only authorized for the duration of time the  declaration that circumstances exist justifying the authorization of the  emergency use of an in vitro diagnostic tests for detection of SARS-CoV-2  virus and/or diagnosis of COVID-19 infection under section 564(b)(1) of  the Act, 21 U  S C  097FDB-6(J)(5), unless the authorization is terminated  or revoked sooner  The test has been validated but independent review by FDA  and CLIA is pending  Test performed using [employer] GeneXpert: This RT-PCR assay targets N2,  a region unique to SARS-CoV-2  A conserved region in the E-gene was chosen  for pan-Sarbecovirus detection which includes SARS-CoV-2  Strep A PCR [[id number]]  (Normal) Collected: 06/05/22 2115    Lab Status: Final result Specimen: Throat Updated: 06/05/22 2146     STREP A PCR Not Detected          All labs reviewed and utilized in the medical decision making process    Radiology:    No orders to display       All radiology studies independently viewed by me and interpreted by the radiologist     Procedure    Procedures        FINAL IMPRESSION    Final diagnoses:   Pharyngitis, unspecified etiology         DISPOSITION    Time reflects when diagnosis was documented in both MDM as applicable and the Disposition within this note     Time User Action Codes Description Comment    6/5/2022 11:36 PM Emilia Misael Add [J02 9] Pharyngitis, unspecified etiology       ED Disposition     ED Disposition   Discharge    Condition   Stable    Date/Time   Sun Jun 5, 2022 11:36 PM    Comment   Maren Valencia discharge to home/self care  Follow-up Information     Follow up With Specialties Details Why Contact Info    Ct Bustillo MD Internal Medicine Schedule an appointment as soon as possible for a visit in 3 days  [address]  [address]  [phone]              PATIENT REFERRED TO:    Ct Bustillo MD  [address]  [address]  [phone]    Schedule an appointment as soon as possible for a visit in 3 days        DISCHARGE MEDICATIONS:    Discharge Medication List as of 6/5/2022 11:37 PM      CONTINUE these medications which have NOT CHANGED    Details   !! albuterol (PROVENTIL HFA,VENTOLIN HFA) 90 mcg/act inhaler Inhale 2 puffs every 6 (six) hours as needed for wheezing, Until Discontinued, Historical Med      !! albuterol (PROVENTIL HFA,VENTOLIN HFA) 90 mcg/act inhaler Inhale 1-2 puffs every 6 (six) hours as needed for wheezing, Starting Tue 1/5/2021, Normal      budesonide-formoterol (SYMBICORT) 160-4 5 mcg/act inhaler Inhale 2 puffs 2 (two) times a day, Until Discontinued, Historical Med      cloNIDine (CATAPRES) 0 1 mg tablet Take 1 tablet (0 1 mg total) by mouth every 12 (twelve) hours for 90 days, Starting Fri 12/14/2018, Until Thu 3/14/2019, Normal      hydrochlorothiazide (HYDRODIURIL) 25 mg tablet Take 1 tablet (25 mg total) by mouth daily, Starting Fri 12/14/2018, Normal      levothyroxine 75 mcg tablet Take 1 tablet (75 mcg total) by mouth daily in the early morning, Starting Sat 12/15/2018, Normal      montelukast (SINGULAIR) 10 mg tablet Take 1 tablet (10 mg total) by mouth daily at bedtime, Starting Fri 12/14/2018, Normal      potassium chloride (K-DUR,KLOR-CON) 10 mEq tablet Take 10 mEq by mouth daily  , Until Discontinued, Historical Med      !! predniSONE 20 mg tablet 40 x 2 days then 30 x 3, 20 x 3 and stop, Normal      !! predniSONE 20 mg tablet Take 1 tablet (20 mg total) by mouth daily, Starting Tue 1/5/2021, Normal      thiamine 100 MG tablet Take 100 mg by mouth daily, Until Discontinued, Historical Med      verapamil (CALAN-SR) 180 mg CR tablet Take 1 tablet (180 mg total) by mouth daily, Starting Fri 12/14/2018, Normal       !! - Potential duplicate medications found  Please discuss with provider  No discharge procedures on file           Tee Jaime DO        This note was partially completed using voice recognition technology, and was scanned for gross errors; however some errors may still exist  Please contact the author with any questions or requests for clarification        Cleveland Caldwell DO  06/06/22 0004

## 2022-12-21 ENCOUNTER — OCCMED (OUTPATIENT)
Dept: URGENT CARE | Facility: CLINIC | Age: 42
End: 2022-12-21

## 2022-12-21 ENCOUNTER — APPOINTMENT (OUTPATIENT)
Dept: RADIOLOGY | Facility: CLINIC | Age: 42
End: 2022-12-21

## 2022-12-21 ENCOUNTER — APPOINTMENT (OUTPATIENT)
Dept: PHYSICAL THERAPY | Facility: CLINIC | Age: 42
End: 2022-12-21

## 2022-12-21 DIAGNOSIS — Z02.1 PHYSICAL EXAM, PRE-EMPLOYMENT: Primary | ICD-10-CM

## 2022-12-21 DIAGNOSIS — Z02.1 PHYSICAL EXAM, PRE-EMPLOYMENT: ICD-10-CM

## 2023-06-25 ENCOUNTER — APPOINTMENT (EMERGENCY)
Dept: RADIOLOGY | Facility: HOSPITAL | Age: 43
End: 2023-06-25
Payer: COMMERCIAL

## 2023-06-25 ENCOUNTER — HOSPITAL ENCOUNTER (EMERGENCY)
Facility: HOSPITAL | Age: 43
Discharge: HOME | End: 2023-06-26
Attending: EMERGENCY MEDICINE
Payer: COMMERCIAL

## 2023-06-25 ENCOUNTER — APPOINTMENT (EMERGENCY)
Dept: CT IMAGING | Facility: HOSPITAL | Age: 43
End: 2023-06-25
Payer: COMMERCIAL

## 2023-06-25 DIAGNOSIS — B34.9 VIRAL SYNDROME: Primary | ICD-10-CM

## 2023-06-25 LAB
ALBUMIN SERPL BCP-MCNC: 4.5 G/DL (ref 3.5–5)
ALP SERPL-CCNC: 78 U/L (ref 34–104)
ALT SERPL W P-5'-P-CCNC: 31 U/L (ref 7–52)
ANION GAP SERPL CALCULATED.3IONS-SCNC: 11 MMOL/L
APTT PPP: 28 SECONDS (ref 23–37)
AST SERPL W P-5'-P-CCNC: 24 U/L (ref 13–39)
ATRIAL RATE: 114 BPM
BASOPHILS # BLD MANUAL: 0 THOUSAND/UL (ref 0–0.1)
BASOPHILS NFR MAR MANUAL: 0 % (ref 0–1)
BILIRUB SERPL-MCNC: 0.62 MG/DL (ref 0.2–1)
BILIRUB UR QL STRIP: NEGATIVE
BUN SERPL-MCNC: 15 MG/DL (ref 5–25)
CALCIUM SERPL-MCNC: 9.6 MG/DL (ref 8.4–10.2)
CHLORIDE SERPL-SCNC: 100 MMOL/L (ref 96–108)
CLARITY UR: CLEAR
CO2 SERPL-SCNC: 23 MMOL/L (ref 21–32)
COLOR UR: YELLOW
CREAT SERPL-MCNC: 1.13 MG/DL (ref 0.6–1.3)
EOSINOPHIL # BLD MANUAL: 0 THOUSAND/UL (ref 0–0.4)
EOSINOPHIL NFR BLD MANUAL: 0 % (ref 0–6)
ERYTHROCYTE [DISTWIDTH] IN BLOOD BY AUTOMATED COUNT: 13.5 % (ref 11.6–15.1)
FLUAV RNA RESP QL NAA+PROBE: NEGATIVE
FLUBV RNA RESP QL NAA+PROBE: NEGATIVE
GFR SERPL CREATININE-BSD FRML MDRD: 59 ML/MIN/1.73SQ M
GLUCOSE SERPL-MCNC: 128 MG/DL (ref 65–140)
GLUCOSE UR STRIP-MCNC: NEGATIVE MG/DL
HCT VFR BLD AUTO: 42.3 % (ref 34.8–46.1)
HGB BLD-MCNC: 14.1 G/DL (ref 11.5–15.4)
HGB UR QL STRIP.AUTO: NEGATIVE
INR PPP: 1 (ref 0.84–1.19)
KETONES UR STRIP-MCNC: NEGATIVE MG/DL
LACTATE SERPL-SCNC: 2.1 MMOL/L (ref 0.5–2)
LEUKOCYTE ESTERASE UR QL STRIP: NEGATIVE
LYMPHOCYTES # BLD AUTO: 0.78 THOUSAND/UL (ref 0.6–4.47)
LYMPHOCYTES # BLD AUTO: 8 % (ref 14–44)
MCH RBC QN AUTO: 27.6 PG (ref 26.8–34.3)
MCHC RBC AUTO-ENTMCNC: 33.3 G/DL (ref 31.4–37.4)
MCV RBC AUTO: 83 FL (ref 82–98)
MONOCYTES # BLD AUTO: 0.29 THOUSAND/UL (ref 0–1.22)
MONOCYTES NFR BLD: 3 % (ref 4–12)
NEUTROPHILS # BLD MANUAL: 8.48 THOUSAND/UL (ref 1.85–7.62)
NEUTS BAND NFR BLD MANUAL: 3 % (ref 0–8)
NEUTS SEG NFR BLD AUTO: 84 % (ref 43–75)
NITRITE UR QL STRIP: NEGATIVE
P AXIS: 56 DEGREES
PH UR STRIP.AUTO: 7.5 [PH]
PLATELET # BLD AUTO: 247 THOUSANDS/UL (ref 149–390)
PLATELET BLD QL SMEAR: ADEQUATE
PMV BLD AUTO: 11.2 FL (ref 8.9–12.7)
POTASSIUM SERPL-SCNC: 3 MMOL/L (ref 3.5–5.3)
PR INTERVAL: 140 MS
PROCALCITONIN SERPL-MCNC: 0.08 NG/ML
PROT SERPL-MCNC: 8.8 G/DL (ref 6.4–8.4)
PROT UR STRIP-MCNC: NEGATIVE MG/DL
PROTHROMBIN TIME: 13 SECONDS (ref 11.6–14.5)
QRS AXIS: -13 DEGREES
QRSD INTERVAL: 76 MS
QT INTERVAL: 348 MS
QTC INTERVAL: 479 MS
RBC # BLD AUTO: 5.11 MILLION/UL (ref 3.81–5.12)
RBC MORPH BLD: NORMAL
RSV RNA RESP QL NAA+PROBE: NEGATIVE
SARS-COV-2 RNA RESP QL NAA+PROBE: NEGATIVE
SODIUM SERPL-SCNC: 134 MMOL/L (ref 135–147)
SP GR UR STRIP.AUTO: 1.02 (ref 1–1.03)
T WAVE AXIS: 43 DEGREES
UROBILINOGEN UR STRIP-ACNC: <2 MG/DL
VARIANT LYMPHS # BLD AUTO: 2 %
VENTRICULAR RATE: 114 BPM
WBC # BLD AUTO: 9.75 THOUSAND/UL (ref 4.31–10.16)

## 2023-06-25 PROCEDURE — 93010 ELECTROCARDIOGRAM REPORT: CPT | Performed by: INTERNAL MEDICINE

## 2023-06-25 PROCEDURE — 87040 BLOOD CULTURE FOR BACTERIA: CPT | Performed by: EMERGENCY MEDICINE

## 2023-06-25 PROCEDURE — 0241U HB NFCT DS VIR RESP RNA 4 TRGT: CPT | Performed by: EMERGENCY MEDICINE

## 2023-06-25 PROCEDURE — 81003 URINALYSIS AUTO W/O SCOPE: CPT | Performed by: EMERGENCY MEDICINE

## 2023-06-25 PROCEDURE — 99284 EMERGENCY DEPT VISIT MOD MDM: CPT | Performed by: EMERGENCY MEDICINE

## 2023-06-25 PROCEDURE — 80053 COMPREHEN METABOLIC PANEL: CPT | Performed by: EMERGENCY MEDICINE

## 2023-06-25 PROCEDURE — 71045 X-RAY EXAM CHEST 1 VIEW: CPT

## 2023-06-25 PROCEDURE — 96361 HYDRATE IV INFUSION ADD-ON: CPT

## 2023-06-25 PROCEDURE — 74177 CT ABD & PELVIS W/CONTRAST: CPT

## 2023-06-25 PROCEDURE — 96375 TX/PRO/DX INJ NEW DRUG ADDON: CPT

## 2023-06-25 PROCEDURE — 93005 ELECTROCARDIOGRAM TRACING: CPT

## 2023-06-25 PROCEDURE — 99285 EMERGENCY DEPT VISIT HI MDM: CPT

## 2023-06-25 PROCEDURE — 83605 ASSAY OF LACTIC ACID: CPT | Performed by: EMERGENCY MEDICINE

## 2023-06-25 PROCEDURE — 85027 COMPLETE CBC AUTOMATED: CPT | Performed by: EMERGENCY MEDICINE

## 2023-06-25 PROCEDURE — 36415 COLL VENOUS BLD VENIPUNCTURE: CPT | Performed by: EMERGENCY MEDICINE

## 2023-06-25 PROCEDURE — 85610 PROTHROMBIN TIME: CPT | Performed by: EMERGENCY MEDICINE

## 2023-06-25 PROCEDURE — 85007 BL SMEAR W/DIFF WBC COUNT: CPT | Performed by: EMERGENCY MEDICINE

## 2023-06-25 PROCEDURE — 84145 PROCALCITONIN (PCT): CPT | Performed by: EMERGENCY MEDICINE

## 2023-06-25 PROCEDURE — 85730 THROMBOPLASTIN TIME PARTIAL: CPT | Performed by: EMERGENCY MEDICINE

## 2023-06-25 RX ORDER — KETOROLAC TROMETHAMINE 30 MG/ML
15 INJECTION, SOLUTION INTRAMUSCULAR; INTRAVENOUS ONCE
Status: COMPLETED | OUTPATIENT
Start: 2023-06-25 | End: 2023-06-25

## 2023-06-25 RX ORDER — ACETAMINOPHEN 325 MG/1
975 TABLET ORAL ONCE
Status: COMPLETED | OUTPATIENT
Start: 2023-06-25 | End: 2023-06-25

## 2023-06-25 RX ORDER — POTASSIUM CHLORIDE 20 MEQ/1
40 TABLET, EXTENDED RELEASE ORAL ONCE
Status: DISCONTINUED | OUTPATIENT
Start: 2023-06-25 | End: 2023-06-26 | Stop reason: HOSPADM

## 2023-06-25 RX ORDER — ONDANSETRON 2 MG/ML
4 INJECTION INTRAMUSCULAR; INTRAVENOUS ONCE
Status: COMPLETED | OUTPATIENT
Start: 2023-06-25 | End: 2023-06-25

## 2023-06-25 RX ADMIN — KETOROLAC TROMETHAMINE 15 MG: 30 INJECTION, SOLUTION INTRAMUSCULAR at 22:49

## 2023-06-25 RX ADMIN — ACETAMINOPHEN 975 MG: 325 TABLET, FILM COATED ORAL at 21:57

## 2023-06-25 RX ADMIN — SODIUM CHLORIDE 1000 ML: 0.9 INJECTION, SOLUTION INTRAVENOUS at 23:45

## 2023-06-25 RX ADMIN — ONDANSETRON 4 MG: 2 INJECTION INTRAMUSCULAR; INTRAVENOUS at 21:30

## 2023-06-25 RX ADMIN — IOHEXOL 100 ML: 350 INJECTION, SOLUTION INTRAVENOUS at 22:10

## 2023-06-25 RX ADMIN — SODIUM CHLORIDE 1000 ML: 0.9 INJECTION, SOLUTION INTRAVENOUS at 21:25

## 2023-06-25 NOTE — Clinical Note
Norm Sanders was seen and treated in our emergency department on 6/25/2023  Diagnosis:     Dennys Anderson  may return to work on return date  She may return on this date: 06/28/2023         If you have any questions or concerns, please don't hesitate to call        Mary Fierro MD    ______________________________           _______________          _______________  Hospital Representative                              Date                                Time

## 2023-06-26 VITALS
TEMPERATURE: 100.6 F | OXYGEN SATURATION: 95 % | DIASTOLIC BLOOD PRESSURE: 63 MMHG | RESPIRATION RATE: 18 BRPM | HEART RATE: 108 BPM | SYSTOLIC BLOOD PRESSURE: 118 MMHG

## 2023-06-26 LAB — LACTATE SERPL-SCNC: 1.8 MMOL/L (ref 0.5–2)

## 2023-06-26 PROCEDURE — 96361 HYDRATE IV INFUSION ADD-ON: CPT

## 2023-06-26 PROCEDURE — 96375 TX/PRO/DX INJ NEW DRUG ADDON: CPT

## 2023-06-26 PROCEDURE — 96365 THER/PROPH/DIAG IV INF INIT: CPT

## 2023-06-26 RX ORDER — MAGNESIUM SULFATE HEPTAHYDRATE 40 MG/ML
2 INJECTION, SOLUTION INTRAVENOUS ONCE
Status: COMPLETED | OUTPATIENT
Start: 2023-06-26 | End: 2023-06-26

## 2023-06-26 RX ORDER — DIPHENHYDRAMINE HYDROCHLORIDE 50 MG/ML
25 INJECTION INTRAMUSCULAR; INTRAVENOUS ONCE
Status: COMPLETED | OUTPATIENT
Start: 2023-06-26 | End: 2023-06-26

## 2023-06-26 RX ORDER — METOCLOPRAMIDE HYDROCHLORIDE 5 MG/ML
10 INJECTION INTRAMUSCULAR; INTRAVENOUS ONCE
Status: COMPLETED | OUTPATIENT
Start: 2023-06-26 | End: 2023-06-26

## 2023-06-26 RX ADMIN — DIPHENHYDRAMINE HYDROCHLORIDE 25 MG: 50 INJECTION, SOLUTION INTRAMUSCULAR; INTRAVENOUS at 01:25

## 2023-06-26 RX ADMIN — MAGNESIUM SULFATE HEPTAHYDRATE 2 G: 40 INJECTION, SOLUTION INTRAVENOUS at 01:26

## 2023-06-26 RX ADMIN — METOCLOPRAMIDE 10 MG: 5 INJECTION, SOLUTION INTRAMUSCULAR; INTRAVENOUS at 01:23

## 2023-06-26 NOTE — ED PROVIDER NOTES
History  Chief Complaint   Patient presents with   • Flu Symptoms     Pt presents to ED with reports of fever up to 103, sore throat, headache, body aches, abd pain, n/v beginning this AM  Pt reports last dose of aleve at 1200  HPI    Prior to Admission Medications   Prescriptions Last Dose Informant Patient Reported? Taking?    albuterol (PROVENTIL HFA,VENTOLIN HFA) 90 mcg/act inhaler   Yes Yes   Sig: Inhale 2 puffs every 6 (six) hours as needed for wheezing   albuterol (PROVENTIL HFA,VENTOLIN HFA) 90 mcg/act inhaler   No Yes   Sig: Inhale 1-2 puffs every 6 (six) hours as needed for wheezing   budesonide-formoterol (SYMBICORT) 160-4 5 mcg/act inhaler   Yes Yes   Sig: Inhale 2 puffs 2 (two) times a day   cloNIDine (CATAPRES) 0 1 mg tablet   No Yes   Sig: Take 1 tablet (0 1 mg total) by mouth every 12 (twelve) hours for 90 days   hydrochlorothiazide (HYDRODIURIL) 25 mg tablet   No Yes   Sig: Take 1 tablet (25 mg total) by mouth daily   levothyroxine 75 mcg tablet   No Yes   Sig: Take 1 tablet (75 mcg total) by mouth daily in the early morning   montelukast (SINGULAIR) 10 mg tablet   No Yes   Sig: Take 1 tablet (10 mg total) by mouth daily at bedtime   potassium chloride (K-DUR,KLOR-CON) 10 mEq tablet   Yes Yes   Sig: Take 10 mEq by mouth 3 (three) times a day 20 mEq in am, 1 in afternoon, 1 in P    thiamine 100 MG tablet   Yes Yes   Sig: Take 100 mg by mouth daily   verapamil (CALAN-SR) 180 mg CR tablet   No Yes   Sig: Take 1 tablet (180 mg total) by mouth daily      Facility-Administered Medications: None       Past Medical History:   Diagnosis Date   • Asthma    • Disease of thyroid gland    • Hypertension        Past Surgical History:   Procedure Laterality Date   • CARPAL TUNNEL RELEASE     •  SECTION      4 c section   • HYSTERECTOMY     • LAPAROSCOPIC GASTRIC BANDING     • VOCAL CORD LATERALIZATION, ENDOSCOPIC APPROACH W/ MLB         Family History   Family history unknown: Yes     I have reviewed and agree with the history as documented      E-Cigarette/Vaping   • E-Cigarette Use Never User      E-Cigarette/Vaping Substances   • Nicotine No    • THC No    • CBD No    • Flavoring No    • Other No    • Unknown No      Social History     Tobacco Use   • Smoking status: Never   • Smokeless tobacco: Never   Vaping Use   • Vaping Use: Never used   Substance Use Topics   • Alcohol use: No   • Drug use: No       Review of Systems    Physical Exam  Physical Exam    Vital Signs  ED Triage Vitals [06/25/23 2055]   Temperature Pulse Respirations Blood Pressure SpO2   (!) 102 9 °F (39 4 °C) (!) 127 20 139/80 96 %      Temp Source Heart Rate Source Patient Position - Orthostatic VS BP Location FiO2 (%)   Oral Monitor Sitting Right arm --      Pain Score       7           Vitals:    06/25/23 2055 06/25/23 2130 06/25/23 2200   BP: 139/80 116/61 115/69   Pulse: (!) 127 (!) 109 (!) 107   Patient Position - Orthostatic VS: Sitting  Lying         Visual Acuity      ED Medications  Medications   sodium chloride 0 9 % bolus 1,000 mL (1,000 mL Intravenous New Bag 6/25/23 2125)   potassium chloride (K-DUR,KLOR-CON) CR tablet 40 mEq (has no administration in time range)   acetaminophen (TYLENOL) tablet 975 mg (975 mg Oral Given 6/25/23 2157)   ondansetron (ZOFRAN) injection 4 mg (4 mg Intravenous Given 6/25/23 2130)   iohexol (OMNIPAQUE) 350 MG/ML injection (SINGLE-DOSE) 100 mL (100 mL Intravenous Given 6/25/23 2210)       Diagnostic Studies  Results Reviewed     Procedure Component Value Units Date/Time    FLU/RSV/COVID - if FLU/RSV clinically relevant [[id number]]  (Normal) Collected: 06/25/23 2130    Lab Status: Final result Specimen: Nares from Nose Updated: 06/25/23 2235     SARS-CoV-2 Negative     INFLUENZA A PCR Negative     INFLUENZA B PCR Negative     RSV PCR Negative    Narrative:      FOR PEDIATRIC PATIENTS - copy/paste COVID Guidelines URL to browser: [email or web address]    SARS-CoV-2 assay is a Nucleic Acid Amplification assay intended for the  qualitative detection of nucleic acid from SARS-CoV-2 in nasopharyngeal  swabs  Results are for the presumptive identification of SARS-CoV-2 RNA  Positive results are indicative of infection with SARS-CoV-2, the virus  causing COVID-19, but do not rule out bacterial infection or co-infection  with other viruses  Laboratories within the [place] and its  territories are required to report all positive results to the appropriate  public health authorities  Negative results do not preclude SARS-CoV-2  infection and should not be used as the sole basis for treatment or other  patient management decisions  Negative results must be combined with  clinical observations, patient history, and epidemiological information  This test has not been FDA cleared or approved  This test has been authorized by FDA under an Emergency Use Authorization  (EUA)  This test is only authorized for the duration of time the  declaration that circumstances exist justifying the authorization of the  emergency use of an in vitro diagnostic tests for detection of SARS-CoV-2  virus and/or diagnosis of COVID-19 infection under section 564(b)(1) of  the Act, 21 U  S C  021GAJ-2(Y)(7), unless the authorization is terminated  or revoked sooner  The test has been validated but independent review by FDA  and CLIA is pending  Test performed using [employer] Gene[employer]pert: This RT-PCR assay targets N2,  a region unique to SARS-CoV-2  A conserved region in the E-gene was chosen  for pan-Sarbecovirus detection which includes SARS-CoV-2  According to CMS-2020-01-R, this platform meets the definition of high-throughput technology      UA w Reflex to Microscopic w Reflex to Culture [[id number]] Collected: 06/25/23 2158    Lab Status: Final result Specimen: Urine, Clean Catch Updated: 06/25/23 2225 Color, UA Yellow     Clarity, UA Clear     Specific Gravity, UA 1 022     pH, UA 7 5     Leukocytes, UA Negative     Nitrite, UA Negative     Protein, UA Negative mg/dl      Glucose, UA Negative mg/dl      Ketones, UA Negative mg/dl      Urobilinogen, UA <2 0 mg/dl      Bilirubin, UA Negative     Occult Blood, UA Negative    CBC and differential [[id number]]  (Normal) Collected: 06/25/23 2118    Lab Status: Final result Specimen: Blood from Arm, Left Updated: 06/25/23 2208     WBC 9 75 Thousand/uL      RBC 5 11 Million/uL      Hemoglobin 14 1 g/dL      Hematocrit 42 3 %      MCV 83 fL      MCH 27 6 pg      MCHC 33 3 g/dL      RDW 13 5 %      MPV 11 2 fL      Platelets 718 Thousands/uL     Narrative: This is an appended report  These results have been appended to a previously verified report  Manual Differential(PHLEBS Do Not Order) [[id number]]  (Abnormal) Collected: 06/25/23 2118    Lab Status: Final result Specimen: Blood from Arm, Left Updated: 06/25/23 2208     Segmented % 84 %      Bands % 3 %      Lymphocytes % 8 %      Monocytes % 3 %      Eosinophils, % 0 %      Basophils % 0 %      Atypical Lymphocytes % 2 %      Absolute Neutrophils 8 48 Thousand/uL      Lymphocytes Absolute 0 78 Thousand/uL      Monocytes Absolute 0 29 Thousand/uL      Eosinophils Absolute 0 00 Thousand/uL      Basophils Absolute 0 00 Thousand/uL      Total Counted --     RBC Morphology Normal     Platelet Estimate Adequate    RBC Morphology Reflex Test [[id number]] Collected: 06/25/23 2118    Lab Status:  In process Specimen: Blood from Arm, Left Updated: 06/25/23 2208    Procalcitonin [[id number]]  (Normal) Collected: 06/25/23 2118    Lab Status: Final result Specimen: Blood from Arm, Left Updated: 06/25/23 2159     Procalcitonin 0 08 ng/ml     Lactic acid [[id number]]  (Abnormal) Collected: 06/25/23 2118    Lab Status: Final result Specimen: Blood from Arm, Left Updated: 06/25/23 2153     LACTIC ACID 2 1 mmol/L     Narrative: Result may be elevated if tourniquet was used during collection  Lactic acid 2 Hours [[id number]]     Lab Status: No result Specimen: Blood     Comprehensive metabolic panel [[id number]]  (Abnormal) Collected: 06/25/23 2118    Lab Status: Final result Specimen: Blood from Arm, Left Updated: 06/25/23 2150     Sodium 134 mmol/L      Potassium 3 0 mmol/L      Chloride 100 mmol/L      CO2 23 mmol/L      ANION GAP 11 mmol/L      BUN 15 mg/dL      Creatinine 1 13 mg/dL      Glucose 128 mg/dL      Calcium 9 6 mg/dL      AST 24 U/L      ALT 31 U/L      Alkaline Phosphatase 78 U/L      Total Protein 8 8 g/dL      Albumin 4 5 g/dL      Total Bilirubin 0 62 mg/dL      eGFR 59 ml/min/1 73sq m     Narrative:      [place] guidelines for Chronic Kidney Disease (CKD):   •  Stage 1 with normal or high GFR (GFR > 90 mL/min/1 73 square meters)  •  Stage 2 Mild CKD (GFR = 60-89 mL/min/1 73 square meters)  •  Stage 3A Moderate CKD (GFR = 45-59 mL/min/1 73 square meters)  •  Stage 3B Moderate CKD (GFR = 30-44 mL/min/1 73 square meters)  •  Stage 4 Severe CKD (GFR = 15-29 mL/min/1 73 square meters)  •  Stage 5 End Stage CKD (GFR <15 mL/min/1 73 square meters)  Note: GFR calculation is accurate only with a steady state creatinine    Protime-INR [[id number]]  (Normal) Collected: 06/25/23 2118    Lab Status: Final result Specimen: Blood from Arm, Left Updated: 06/25/23 2145     Protime 13 0 seconds      INR 1 00    APTT [[id number]]  (Normal) Collected: 06/25/23 2118    Lab Status: Final result Specimen: Blood from Arm, Left Updated: 06/25/23 2145     PTT 28 seconds     Blood culture #2 [[id number]] Collected: 06/25/23 2118    Lab Status: In process Specimen: Blood from Arm, Left Updated: 06/25/23 2130    Blood culture #1 [[id number]] Collected: 06/25/23 2123    Lab Status:  In process Specimen: Blood from Arm, Right Updated: 06/25/23 2130                 XR chest 1 view portable    (Results Pending)   CT abdomen pelvis with contrast    (Results Pending)              Procedures  Procedures         ED Course                               SBIRT 20yo+    Flowsheet Row Most Recent Value   Initial Alcohol Screen: US AUDIT-C     1  How often do you have a drink containing alcohol? 0 Filed at: 06/25/2023 2054   2  How many drinks containing alcohol do you have on a typical day you are drinking? 0 Filed at: 06/25/2023 2054   3b  FEMALE Any Age, or MALE 65+: How often do you have 4 or more drinks on one occassion? 0 Filed at: 06/25/2023 2054   Audit-C Score 0 Filed at: 06/25/2023 2054   AZEEM: How many times in the past year have you    Used an illegal drug or used a prescription medication for non-medical reasons? Never Filed at: 06/25/2023 2054                    MDM    Disposition  Final diagnoses:   None     ED Disposition     None      Follow-up Information    None         Patient's Medications   Discharge Prescriptions    No medications on file       No discharge procedures on file      PDMP Review     None          ED Provider  Electronically Signed by Specimen: Blood from Arm, Right Updated: 06/25/23 2130                 XR chest 1 view portable    (Results Pending)   CT abdomen pelvis with contrast    (Results Pending)              Procedures  Procedures         ED Course                               SBIRT 22yo+    Flowsheet Row Most Recent Value   Initial Alcohol Screen: US AUDIT-C     1. How often do you have a drink containing alcohol? 0 Filed at: 06/25/2023 2054   2. How many drinks containing alcohol do you have on a typical day you are drinking? 0 Filed at: 06/25/2023 2054   3b. FEMALE Any Age, or MALE 65+: How often do you have 4 or more drinks on one occassion? 0 Filed at: 06/25/2023 2054   Audit-C Score 0 Filed at: 06/25/2023 2054   AZEEM: How many times in the past year have you. .. Used an illegal drug or used a prescription medication for non-medical reasons? Never Filed at: 06/25/2023 2054                    Medical Decision Making  51-year-old female with febrile illness. Given the broad-spectrum of her symptoms suspected to be a viral syndrome, though will screen for bacterial causes with labs, CT, urine, chest x-ray, will give fluids and NSAIDs here, reassess. Does not suggest any bacterial source of illness. Patient's fever and overall symptoms are improved after treatment here, recommend discharge with supportive care, PCP follow-up, reviewed return precautions. Amount and/or Complexity of Data Reviewed  Labs: ordered. Radiology: ordered. Risk  OTC drugs. Prescription drug management. Disposition  Final diagnoses:   None     ED Disposition     None      Follow-up Information    None         Patient's Medications   Discharge Prescriptions    No medications on file       No discharge procedures on file.     PDMP Review     None          ED Provider  Electronically Signed by           Catherine Givens MD  07/11/23 1248

## 2023-06-26 NOTE — ED NOTES
Patient transported to [address] [place] [address], [address] [place] [address]  06/25/23 1696

## 2023-07-01 LAB
BACTERIA BLD CULT: NORMAL
BACTERIA BLD CULT: NORMAL

## 2023-10-03 ENCOUNTER — HOSPITAL ENCOUNTER (EMERGENCY)
Facility: HOSPITAL | Age: 43
Discharge: HOME | End: 2023-10-03
Attending: EMERGENCY MEDICINE | Admitting: EMERGENCY MEDICINE
Payer: COMMERCIAL

## 2023-10-03 ENCOUNTER — APPOINTMENT (EMERGENCY)
Dept: RADIOLOGY | Facility: HOSPITAL | Age: 43
End: 2023-10-03
Payer: COMMERCIAL

## 2023-10-03 VITALS
HEART RATE: 101 BPM | WEIGHT: 220 LBS | SYSTOLIC BLOOD PRESSURE: 139 MMHG | DIASTOLIC BLOOD PRESSURE: 88 MMHG | RESPIRATION RATE: 20 BRPM | OXYGEN SATURATION: 98 % | TEMPERATURE: 99.5 F | BODY MASS INDEX: 38.97 KG/M2

## 2023-10-03 DIAGNOSIS — J02.0 STREP PHARYNGITIS: Primary | ICD-10-CM

## 2023-10-03 LAB
ALBUMIN SERPL BCP-MCNC: 4.3 G/DL (ref 3.5–5)
ALP SERPL-CCNC: 98 U/L (ref 34–104)
ALT SERPL W P-5'-P-CCNC: 37 U/L (ref 7–52)
ANION GAP SERPL CALCULATED.3IONS-SCNC: 10 MMOL/L
APTT PPP: 32 SECONDS (ref 23–37)
AST SERPL W P-5'-P-CCNC: 31 U/L (ref 13–39)
BASOPHILS # BLD AUTO: 0.04 THOUSANDS/ÂΜL (ref 0–0.1)
BASOPHILS NFR BLD AUTO: 0 % (ref 0–1)
BILIRUB SERPL-MCNC: 0.69 MG/DL (ref 0.2–1)
BILIRUB UR QL STRIP: NEGATIVE
BUN SERPL-MCNC: 12 MG/DL (ref 5–25)
CALCIUM SERPL-MCNC: 9.3 MG/DL (ref 8.4–10.2)
CHLORIDE SERPL-SCNC: 103 MMOL/L (ref 96–108)
CLARITY UR: CLEAR
CO2 SERPL-SCNC: 21 MMOL/L (ref 21–32)
COLOR UR: NORMAL
CREAT SERPL-MCNC: 0.99 MG/DL (ref 0.6–1.3)
EOSINOPHIL # BLD AUTO: 0 THOUSAND/ÂΜL (ref 0–0.61)
EOSINOPHIL NFR BLD AUTO: 0 % (ref 0–6)
ERYTHROCYTE [DISTWIDTH] IN BLOOD BY AUTOMATED COUNT: 14.3 % (ref 11.6–15.1)
FLUAV RNA RESP QL NAA+PROBE: NEGATIVE
FLUBV RNA RESP QL NAA+PROBE: NEGATIVE
GFR SERPL CREATININE-BSD FRML MDRD: 70 ML/MIN/1.73SQ M
GLUCOSE SERPL-MCNC: 100 MG/DL (ref 65–140)
GLUCOSE UR STRIP-MCNC: NEGATIVE MG/DL
HCG SERPL QL: NEGATIVE
HCT VFR BLD AUTO: 43.1 % (ref 34.8–46.1)
HGB BLD-MCNC: 14.4 G/DL (ref 11.5–15.4)
HGB UR QL STRIP.AUTO: NEGATIVE
IMM GRANULOCYTES # BLD AUTO: 0.04 THOUSAND/UL (ref 0–0.2)
IMM GRANULOCYTES NFR BLD AUTO: 0 % (ref 0–2)
INR PPP: 1.04 (ref 0.84–1.19)
KETONES UR STRIP-MCNC: NEGATIVE MG/DL
LACTATE SERPL-SCNC: 1.1 MMOL/L (ref 0.5–2)
LEUKOCYTE ESTERASE UR QL STRIP: NEGATIVE
LYMPHOCYTES # BLD AUTO: 1.4 THOUSANDS/ÂΜL (ref 0.6–4.47)
LYMPHOCYTES NFR BLD AUTO: 13 % (ref 14–44)
MCH RBC QN AUTO: 27.7 PG (ref 26.8–34.3)
MCHC RBC AUTO-ENTMCNC: 33.4 G/DL (ref 31.4–37.4)
MCV RBC AUTO: 83 FL (ref 82–98)
MONOCYTES # BLD AUTO: 0.62 THOUSAND/ÂΜL (ref 0.17–1.22)
MONOCYTES NFR BLD AUTO: 6 % (ref 4–12)
NEUTROPHILS # BLD AUTO: 9.13 THOUSANDS/ÂΜL (ref 1.85–7.62)
NEUTS SEG NFR BLD AUTO: 81 % (ref 43–75)
NITRITE UR QL STRIP: NEGATIVE
NRBC BLD AUTO-RTO: 0 /100 WBCS
PH UR STRIP.AUTO: 7 [PH]
PLATELET # BLD AUTO: 270 THOUSANDS/UL (ref 149–390)
PMV BLD AUTO: 10.7 FL (ref 8.9–12.7)
POTASSIUM SERPL-SCNC: 3.1 MMOL/L (ref 3.5–5.3)
PROCALCITONIN SERPL-MCNC: 0.12 NG/ML
PROT SERPL-MCNC: 8.5 G/DL (ref 6.4–8.4)
PROT UR STRIP-MCNC: NEGATIVE MG/DL
PROTHROMBIN TIME: 14.2 SECONDS (ref 11.6–14.5)
RBC # BLD AUTO: 5.2 MILLION/UL (ref 3.81–5.12)
RSV RNA RESP QL NAA+PROBE: NEGATIVE
S PYO DNA THROAT QL NAA+PROBE: DETECTED
SARS-COV-2 RNA RESP QL NAA+PROBE: NEGATIVE
SODIUM SERPL-SCNC: 134 MMOL/L (ref 135–147)
SP GR UR STRIP.AUTO: 1.01 (ref 1–1.03)
UROBILINOGEN UR STRIP-ACNC: <2 MG/DL
WBC # BLD AUTO: 11.23 THOUSAND/UL (ref 4.31–10.16)

## 2023-10-03 PROCEDURE — 0241U HB NFCT DS VIR RESP RNA 4 TRGT: CPT | Performed by: EMERGENCY MEDICINE

## 2023-10-03 PROCEDURE — 87040 BLOOD CULTURE FOR BACTERIA: CPT | Performed by: EMERGENCY MEDICINE

## 2023-10-03 PROCEDURE — 71046 X-RAY EXAM CHEST 2 VIEWS: CPT

## 2023-10-03 PROCEDURE — 96360 HYDRATION IV INFUSION INIT: CPT

## 2023-10-03 PROCEDURE — 81003 URINALYSIS AUTO W/O SCOPE: CPT | Performed by: EMERGENCY MEDICINE

## 2023-10-03 PROCEDURE — 84145 PROCALCITONIN (PCT): CPT | Performed by: EMERGENCY MEDICINE

## 2023-10-03 PROCEDURE — 85610 PROTHROMBIN TIME: CPT | Performed by: EMERGENCY MEDICINE

## 2023-10-03 PROCEDURE — 83605 ASSAY OF LACTIC ACID: CPT | Performed by: EMERGENCY MEDICINE

## 2023-10-03 PROCEDURE — 85730 THROMBOPLASTIN TIME PARTIAL: CPT | Performed by: EMERGENCY MEDICINE

## 2023-10-03 PROCEDURE — 99284 EMERGENCY DEPT VISIT MOD MDM: CPT | Performed by: EMERGENCY MEDICINE

## 2023-10-03 PROCEDURE — 85025 COMPLETE CBC W/AUTO DIFF WBC: CPT | Performed by: EMERGENCY MEDICINE

## 2023-10-03 PROCEDURE — 80053 COMPREHEN METABOLIC PANEL: CPT | Performed by: EMERGENCY MEDICINE

## 2023-10-03 PROCEDURE — 99283 EMERGENCY DEPT VISIT LOW MDM: CPT

## 2023-10-03 PROCEDURE — 87651 STREP A DNA AMP PROBE: CPT | Performed by: EMERGENCY MEDICINE

## 2023-10-03 PROCEDURE — 84703 CHORIONIC GONADOTROPIN ASSAY: CPT | Performed by: EMERGENCY MEDICINE

## 2023-10-03 PROCEDURE — 36415 COLL VENOUS BLD VENIPUNCTURE: CPT | Performed by: EMERGENCY MEDICINE

## 2023-10-03 RX ORDER — POTASSIUM CHLORIDE 20 MEQ/1
40 TABLET, EXTENDED RELEASE ORAL ONCE
Status: COMPLETED | OUTPATIENT
Start: 2023-10-03 | End: 2023-10-03

## 2023-10-03 RX ORDER — AMOXICILLIN 500 MG/1
500 CAPSULE ORAL EVERY 12 HOURS SCHEDULED
Qty: 20 CAPSULE | Refills: 0 | Status: SHIPPED | OUTPATIENT
Start: 2023-10-03 | End: 2023-10-13

## 2023-10-03 RX ORDER — IBUPROFEN 600 MG/1
600 TABLET ORAL EVERY 6 HOURS PRN
Status: DISCONTINUED | OUTPATIENT
Start: 2023-10-03 | End: 2023-10-03

## 2023-10-03 RX ORDER — AMOXICILLIN 250 MG/1
500 CAPSULE ORAL ONCE
Status: COMPLETED | OUTPATIENT
Start: 2023-10-03 | End: 2023-10-03

## 2023-10-03 RX ADMIN — AMOXICILLIN 500 MG: 250 CAPSULE ORAL at 22:33

## 2023-10-03 RX ADMIN — IBUPROFEN 600 MG: 600 TABLET ORAL at 19:22

## 2023-10-03 RX ADMIN — DEXAMETHASONE SODIUM PHOSPHATE 10 MG: 10 INJECTION, SOLUTION INTRAMUSCULAR; INTRAVENOUS at 21:32

## 2023-10-03 RX ADMIN — SODIUM CHLORIDE 1000 ML: 0.9 INJECTION, SOLUTION INTRAVENOUS at 21:33

## 2023-10-03 RX ADMIN — POTASSIUM CHLORIDE 40 MEQ: 1500 TABLET, EXTENDED RELEASE ORAL at 22:32

## 2023-10-04 NOTE — ED PROVIDER NOTES
History  Chief Complaint   Patient presents with   • Fever     Fever and sore throat with headache starting last night. Fever 102. 8. took tylenol and ibuprofen. Took aleeve at [employer] and tylenol at 65     38 y/o female presents to the ED for flu like symptoms since last night. States that she has had a fever to 103, sore throat, body aches, headache, and cough. Has tried tylenol and naproxen without relief. Denies any known sick contacts. No other complaints. History provided by:  Patient  Flu Symptoms  Presenting symptoms: cough, fever, headache, myalgias and sore throat    Presenting symptoms: no diarrhea, no nausea, no shortness of breath and no vomiting    Severity:  Moderate  Onset quality:  Sudden  Duration:  1 day  Progression:  Worsening  Chronicity:  New  Relieved by:  None tried  Worsened by:  Nothing  Ineffective treatments:  None tried  Associated symptoms: no chills, no ear pain, no congestion and no neck stiffness    Risk factors: no sick contacts        Prior to Admission Medications   Prescriptions Last Dose Informant Patient Reported? Taking?    albuterol (PROVENTIL HFA,VENTOLIN HFA) 90 mcg/act inhaler   Yes No   Sig: Inhale 2 puffs every 6 (six) hours as needed for wheezing   albuterol (PROVENTIL HFA,VENTOLIN HFA) 90 mcg/act inhaler   No No   Sig: Inhale 1-2 puffs every 6 (six) hours as needed for wheezing   budesonide-formoterol (SYMBICORT) 160-4.5 mcg/act inhaler   Yes No   Sig: Inhale 2 puffs 2 (two) times a day   cloNIDine (CATAPRES) 0.1 mg tablet   No No   Sig: Take 1 tablet (0.1 mg total) by mouth every 12 (twelve) hours for 90 days   hydrochlorothiazide (HYDRODIURIL) 25 mg tablet   No No   Sig: Take 1 tablet (25 mg total) by mouth daily   levothyroxine 75 mcg tablet   No No   Sig: Take 1 tablet (75 mcg total) by mouth daily in the early morning   montelukast (SINGULAIR) 10 mg tablet   No No   Sig: Take 1 tablet (10 mg total) by mouth daily at bedtime   potassium chloride (K-DUR,KLOR-CON) 10 mEq tablet   Yes No   Sig: Take 10 mEq by mouth 3 (three) times a day 20 mEq in am, 1 in afternoon, 1 in P.   thiamine 100 MG tablet   Yes No   Sig: Take 100 mg by mouth daily   verapamil (CALAN-SR) 180 mg CR tablet   No No   Sig: Take 1 tablet (180 mg total) by mouth daily      Facility-Administered Medications: None       Past Medical History:   Diagnosis Date   • Asthma    • Disease of thyroid gland    • Hypertension        Past Surgical History:   Procedure Laterality Date   • CARPAL TUNNEL RELEASE     •  SECTION      4 c section   • HYSTERECTOMY     • LAPAROSCOPIC GASTRIC BANDING     • VOCAL CORD LATERALIZATION, ENDOSCOPIC APPROACH W/ MLB         Family History   Family history unknown: Yes     I have reviewed and agree with the history as documented. E-Cigarette/Vaping   • E-Cigarette Use Never User      E-Cigarette/Vaping Substances   • Nicotine No    • THC No    • CBD No    • Flavoring No    • Other No    • Unknown No      Social History     Tobacco Use   • Smoking status: Never   • Smokeless tobacco: Never   Vaping Use   • Vaping Use: Never used   Substance Use Topics   • Alcohol use: No   • Drug use: No       Review of Systems   Constitutional: Positive for fever. Negative for chills. HENT: Positive for sore throat. Negative for congestion and ear pain. Eyes: Negative for pain and visual disturbance. Respiratory: Positive for cough. Negative for shortness of breath and wheezing. Cardiovascular: Negative for chest pain and leg swelling. Gastrointestinal: Negative for abdominal pain, diarrhea, nausea and vomiting. Genitourinary: Negative for dysuria, frequency, hematuria and urgency. Musculoskeletal: Positive for myalgias. Negative for neck pain and neck stiffness. Skin: Negative for rash and wound. Neurological: Positive for headaches. Negative for weakness and numbness. Psychiatric/Behavioral: Negative for agitation and confusion.    All other systems reviewed and are negative. Physical Exam  Physical Exam  Vitals and nursing note reviewed. Constitutional:       Appearance: She is well-developed. HENT:      Head: Normocephalic and atraumatic. Mouth/Throat:      Pharynx: Oropharyngeal exudate and posterior oropharyngeal erythema present. Comments: No signs of peritonsillar abscess   Eyes:      Pupils: Pupils are equal, round, and reactive to light. Cardiovascular:      Rate and Rhythm: Normal rate and regular rhythm. Pulmonary:      Effort: Pulmonary effort is normal.      Breath sounds: Normal breath sounds. Abdominal:      General: Bowel sounds are normal.      Palpations: Abdomen is soft. Tenderness: There is no abdominal tenderness. Musculoskeletal:         General: Normal range of motion. Cervical back: Normal range of motion and neck supple. Skin:     General: Skin is warm and dry. Neurological:      General: No focal deficit present. Mental Status: She is alert and oriented to person, place, and time.       Comments: No focal deficits         Vital Signs  ED Triage Vitals   Temperature Pulse Respirations Blood Pressure SpO2   10/03/23 1917 10/03/23 1917 10/03/23 1917 10/03/23 1917 10/03/23 1917   (!) 102.8 °F (39.3 °C) (!) 133 20 139/88 98 %      Temp Source Heart Rate Source Patient Position - Orthostatic VS BP Location FiO2 (%)   10/03/23 1917 10/03/23 1917 -- -- --   Oral Monitor         Pain Score       10/03/23 1922       Med Not Given for Pain - for MAR use only           Vitals:    10/03/23 1917 10/03/23 2239   BP: 139/88    Pulse: (!) 133 101         Visual Acuity  Visual Acuity    Flowsheet Row Most Recent Value   L Pupil Size (mm) 3   R Pupil Size (mm) 3          ED Medications  Medications   sodium chloride 0.9 % bolus 1,000 mL (0 mL Intravenous Stopped 10/3/23 2230)   dexamethasone oral liquid 10 mg 1 mL (10 mg Oral Given 10/3/23 2132)   potassium chloride (K-DUR,KLOR-CON) CR tablet 40 mEq (40 mEq Oral Given 10/3/23 2232)   amoxicillin (AMOXIL) capsule 500 mg (500 mg Oral Given 10/3/23 2233)       Diagnostic Studies  Results Reviewed     Procedure Component Value Units Date/Time    Blood culture #1 [[id number]] Collected: 10/03/23 2125    Lab Status: Preliminary result Specimen: Blood from Arm, Left Updated: 10/06/23 0901     Blood Culture No Growth at 48 hrs. Blood culture #2 [[id number]] Collected: 10/03/23 2125    Lab Status: Preliminary result Specimen: Blood from Arm, Left Updated: 10/06/23 0901     Blood Culture No Growth at 48 hrs.     UA w Reflex to Microscopic w Reflex to Culture [[id number]] Collected: 10/03/23 2212    Lab Status: Final result Specimen: Urine, Clean Catch Updated: 10/03/23 2221     Color, UA Light Yellow     Clarity, UA Clear     Specific Gravity, UA 1.015     pH, UA 7.0     Leukocytes, UA Negative     Nitrite, UA Negative     Protein, UA Negative mg/dl      Glucose, UA Negative mg/dl      Ketones, UA Negative mg/dl      Urobilinogen, UA <2.0 mg/dl      Bilirubin, UA Negative     Occult Blood, UA Negative    Procalcitonin [[id number]]  (Normal) Collected: 10/03/23 2125    Lab Status: Final result Specimen: Blood from Arm, Left Updated: 10/03/23 2218     Procalcitonin 0.12 ng/ml     hCG, qualitative pregnancy [[id number]]  (Normal) Collected: 10/03/23 2125    Lab Status: Final result Specimen: Blood from Arm, Left Updated: 10/03/23 2217     Preg, Serum Negative    Comprehensive metabolic panel [[id number]]  (Abnormal) Collected: 10/03/23 2125    Lab Status: Final result Specimen: Blood from Arm, Left Updated: 10/03/23 2211     Sodium 134 mmol/L      Potassium 3.1 mmol/L      Chloride 103 mmol/L      CO2 21 mmol/L      ANION GAP 10 mmol/L      BUN 12 mg/dL      Creatinine 0.99 mg/dL      Glucose 100 mg/dL      Calcium 9.3 mg/dL      AST 31 U/L      ALT 37 U/L      Alkaline Phosphatase 98 U/L      Total Protein 8.5 g/dL      Albumin 4.3 g/dL      Total Bilirubin 0.69 mg/dL      eGFR 70 ml/min/1.73sq m     Narrative:      [facility] guidelines for Chronic Kidney Disease (CKD):   •  Stage 1 with normal or high GFR (GFR > 90 mL/min/1.73 square meters)  •  Stage 2 Mild CKD (GFR = 60-89 mL/min/1.73 square meters)  •  Stage 3A Moderate CKD (GFR = 45-59 mL/min/1.73 square meters)  •  Stage 3B Moderate CKD (GFR = 30-44 mL/min/1.73 square meters)  •  Stage 4 Severe CKD (GFR = 15-29 mL/min/1.73 square meters)  •  Stage 5 End Stage CKD (GFR <15 mL/min/1.73 square meters)  Note: GFR calculation is accurate only with a steady state creatinine    Lactic acid [[id number]]  (Normal) Collected: 10/03/23 2125    Lab Status: Final result Specimen: Blood from Arm, Left Updated: 10/03/23 2210     LACTIC ACID 1.1 mmol/L     Narrative:      Result may be elevated if tourniquet was used during collection.     Strep A PCR [[id number]]  (Abnormal) Collected: 10/03/23 2125    Lab Status: Final result Specimen: Throat Updated: 10/03/23 2156     STREP A PCR Detected    Protime-INR [[id number]]  (Normal) Collected: 10/03/23 2125    Lab Status: Final result Specimen: Blood from Arm, Left Updated: 10/03/23 2150     Protime 14.2 seconds      INR 1.04    APTT [[id number]]  (Normal) Collected: 10/03/23 2125    Lab Status: Final result Specimen: Blood from Arm, Left Updated: 10/03/23 2150     PTT 32 seconds     CBC and differential [[id number]]  (Abnormal) Collected: 10/03/23 2125    Lab Status: Final result Specimen: Blood from Arm, Left Updated: 10/03/23 2133     WBC 11.23 Thousand/uL      RBC 5.20 Million/uL      Hemoglobin 14.4 g/dL      Hematocrit 43.1 %      MCV 83 fL      MCH 27.7 pg      MCHC 33.4 g/dL      RDW 14.3 %      MPV 10.7 fL      Platelets 704 Thousands/uL      nRBC 0 /100 WBCs      Neutrophils Relative 81 %      Immat GRANS % 0 %      Lymphocytes Relative 13 %      Monocytes Relative 6 %      Eosinophils Relative 0 %      Basophils Relative 0 %      Neutrophils Absolute 9.13 Thousands/µL Immature Grans Absolute 0.04 Thousand/uL      Lymphocytes Absolute 1.40 Thousands/µL      Monocytes Absolute 0.62 Thousand/µL      Eosinophils Absolute 0.00 Thousand/µL      Basophils Absolute 0.04 Thousands/µL     FLU/RSV/COVID - if FLU/RSV clinically relevant [[id number]]  (Normal) Collected: 10/03/23 1920    Lab Status: Final result Specimen: Nares from Nose Updated: 10/03/23 2050     SARS-CoV-2 Negative     INFLUENZA A PCR Negative     INFLUENZA B PCR Negative     RSV PCR Negative    Narrative:      FOR PEDIATRIC PATIENTS - copy/paste COVID Guidelines URL to browser: [email or web address]    SARS-CoV-2 assay is a Nucleic Acid Amplification assay intended for the  qualitative detection of nucleic acid from SARS-CoV-2 in nasopharyngeal  swabs. Results are for the presumptive identification of SARS-CoV-2 RNA. Positive results are indicative of infection with SARS-CoV-2, the virus  causing COVID-19, but do not rule out bacterial infection or co-infection  with other viruses. Laboratories within the [facility] and its  territories are required to report all positive results to the appropriate  public health authorities. Negative results do not preclude SARS-CoV-2  infection and should not be used as the sole basis for treatment or other  patient management decisions. Negative results must be combined with  clinical observations, patient history, and epidemiological information. This test has not been FDA cleared or approved. This test has been authorized by FDA under an Emergency Use Authorization  (EUA). This test is only authorized for the duration of time the  declaration that circumstances exist justifying the authorization of the  emergency use of an in vitro diagnostic tests for detection of SARS-CoV-2  virus and/or diagnosis of COVID-19 infection under section 564(b)(1) of  the Act, 21 U. S.C. 555ZJU-6(L)(3), unless the authorization is terminated  or revoked sooner. The test has been validated but independent review by FDA  and CLIA is pending. Test performed using [employer] GeneXpert: This RT-PCR assay targets N2,  a region unique to SARS-CoV-2. A conserved region in the E-gene was chosen  for pan-Sarbecovirus detection which includes SARS-CoV-2. According to CMS-2020-01-R, this platform meets the definition of high-throughput technology. XR chest 2 views   Final Result by Deepthi Ordonez MD (10/04 3462)      No acute cardiopulmonary disease. Findings are stable            Workstation performed: [id number]                    Procedures  Procedures         ED Course  ED Course as of 10/06/23 1254   Tue Oct 03, 2023   2202 STREP A PCR(!): Detected                               SBIRT 22yo+    Flowsheet Row Most Recent Value   Initial Alcohol Screen: US AUDIT-C     1. How often do you have a drink containing alcohol? 0 Filed at: 10/03/2023 1919   2. How many drinks containing alcohol do you have on a typical day you are drinking? 0 Filed at: 10/03/2023 1919   3a. Male UNDER 65: How often do you have five or more drinks on one occasion? 0 Filed at: 10/03/2023 1919   3b. FEMALE Any Age, or MALE 65+: How often do you have 4 or more drinks on one occassion? 0 Filed at: 10/03/2023 1919   Audit-C Score 0 Filed at: 10/03/2023 1919   AZEEM: How many times in the past year have you. .. Used an illegal drug or used a prescription medication for non-medical reasons? Never Filed at: 10/03/2023 [address] [place] [address] Making  36 y/o female with flu like symptoms- will get labs, UA, cxr, covid/ flu, and strep testing. Labs, strep, and cxr reviewed and positive for strep. Will give abx and d/c.     Strep pharyngitis: acute illness or injury  Amount and/or Complexity of Data Reviewed  Labs: ordered. Decision-making details documented in ED Course. Radiology: ordered.       Risk  Prescription drug management. Disposition  Final diagnoses:   Strep pharyngitis     Time reflects when diagnosis was documented in both MDM as applicable and the Disposition within this note     Time User Action Codes Description Comment    10/3/2023 10:29 PM Patrica CORDERO David [J02.0] Strep pharyngitis       ED Disposition     ED Disposition   Discharge    Condition   Stable    Date/Time   Tue Oct 3, 2023 10:29 PM    Comment   Kaveh Height discharge to home/self care. Follow-up Information     Follow up With Specialties Details Why Contact Info Additional 350 Aman Ramírez MD Internal Medicine Call in 1 day for follow up within 2-3 days [phone] [facility] Emergency Department Emergency Medicine Go to  immediately for any new or worsening symptoms [address] [facility] [id number] [address] [facility] Emergency Department, [place], [place], [address]          Discharge Medication List as of 10/3/2023 10:39 PM      START taking these medications    Details   amoxicillin (AMOXIL) 500 mg capsule Take 1 capsule (500 mg total) by mouth every 12 (twelve) hours for 10 days, Starting Tue 10/3/2023, Until Fri 10/13/2023, Normal         CONTINUE these medications which have NOT CHANGED    Details   !! albuterol (PROVENTIL HFA,VENTOLIN HFA) 90 mcg/act inhaler Inhale 2 puffs every 6 (six) hours as needed for wheezing, Until Discontinued, Historical Med      !! albuterol (PROVENTIL HFA,VENTOLIN HFA) 90 mcg/act inhaler Inhale 1-2 puffs every 6 (six) hours as needed for wheezing, Starting Tue 1/5/2021, Normal      budesonide-formoterol (SYMBICORT) 160-4.5 mcg/act inhaler Inhale 2 puffs 2 (two) times a day, Until Discontinued, Historical Med      cloNIDine (CATAPRES) 0.1 mg tablet Take 1 tablet (0.1 mg total) by mouth every 12 (twelve) hours for 90 days, Starting Fri 12/14/2018, Until Sun 6/25/2023, Normal      hydrochlorothiazide (HYDRODIURIL) 25 mg tablet Take 1 tablet (25 mg total) by mouth daily, Starting Fri 12/14/2018, Normal      levothyroxine 75 mcg tablet Take 1 tablet (75 mcg total) by mouth daily in the early morning, Starting Sat 12/15/2018, Normal      montelukast (SINGULAIR) 10 mg tablet Take 1 tablet (10 mg total) by mouth daily at bedtime, Starting Fri 12/14/2018, Normal      potassium chloride (K-DUR,KLOR-CON) 10 mEq tablet Take 10 mEq by mouth 3 (three) times a day 20 mEq in am, 1 in afternoon, 1 in P., Historical Med      thiamine 100 MG tablet Take 100 mg by mouth daily, Until Discontinued, Historical Med      verapamil (CALAN-SR) 180 mg CR tablet Take 1 tablet (180 mg total) by mouth daily, Starting Fri 12/14/2018, Normal       !! - Potential duplicate medications found. Please discuss with provider. No discharge procedures on file.     PDMP Review     None          ED Provider  Electronically Signed by           Johan Bowers DO  10/06/23 9179

## 2023-10-08 LAB
BACTERIA BLD CULT: NORMAL
BACTERIA BLD CULT: NORMAL

## 2023-10-09 LAB
BACTERIA BLD CULT: NORMAL
BACTERIA BLD CULT: NORMAL

## 2024-02-21 PROBLEM — J18.9 PNEUMONIA: Status: RESOLVED | Noted: 2017-04-26 | Resolved: 2024-02-21

## 2024-12-28 ENCOUNTER — OCCMED (OUTPATIENT)
Age: 44
End: 2024-12-28

## 2024-12-28 ENCOUNTER — APPOINTMENT (OUTPATIENT)
Age: 44
End: 2024-12-28

## 2024-12-28 DIAGNOSIS — Z00.00 ENCOUNTER FOR PHYSICAL EXAMINATION: Primary | ICD-10-CM

## 2024-12-28 DIAGNOSIS — Z00.00 ENCOUNTER FOR PHYSICAL EXAMINATION: ICD-10-CM

## 2024-12-28 PROCEDURE — 71045 X-RAY EXAM CHEST 1 VIEW: CPT
